# Patient Record
Sex: MALE | Race: BLACK OR AFRICAN AMERICAN | Employment: FULL TIME | URBAN - METROPOLITAN AREA
[De-identification: names, ages, dates, MRNs, and addresses within clinical notes are randomized per-mention and may not be internally consistent; named-entity substitution may affect disease eponyms.]

---

## 2017-08-29 ENCOUNTER — HOSPITAL ENCOUNTER (EMERGENCY)
Age: 37
Discharge: HOME | End: 2017-08-29
Attending: EMERGENCY MEDICINE

## 2017-08-29 ENCOUNTER — APPOINTMENT (OUTPATIENT)
Dept: GENERAL RADIOLOGY | Age: 37
End: 2017-08-29

## 2017-08-29 VITALS
TEMPERATURE: 98.8 F | SYSTOLIC BLOOD PRESSURE: 186 MMHG | OXYGEN SATURATION: 98 % | DIASTOLIC BLOOD PRESSURE: 111 MMHG | HEART RATE: 93 BPM | BODY MASS INDEX: 42.66 KG/M2 | HEIGHT: 72 IN | RESPIRATION RATE: 20 BRPM | WEIGHT: 315 LBS

## 2017-08-29 DIAGNOSIS — R04.2 HEMOPTYSIS: ICD-10-CM

## 2017-08-29 DIAGNOSIS — M54.6 ACUTE RIGHT-SIDED THORACIC BACK PAIN: Primary | ICD-10-CM

## 2017-08-29 DIAGNOSIS — I10 ESSENTIAL HYPERTENSION: ICD-10-CM

## 2017-08-29 LAB
-: NORMAL
ABSOLUTE EOS #: 0.2 K/UL (ref 0–0.4)
ABSOLUTE LYMPH #: 2 K/UL (ref 1–4.8)
ABSOLUTE MONO #: 0.5 K/UL (ref 0.2–0.8)
ALBUMIN SERPL-MCNC: 3.8 G/DL (ref 3.5–5.2)
ALBUMIN/GLOBULIN RATIO: ABNORMAL (ref 1–2.5)
ALP BLD-CCNC: 71 U/L (ref 40–129)
ALT SERPL-CCNC: 18 U/L (ref 5–41)
AMORPHOUS: NORMAL
ANION GAP SERPL CALCULATED.3IONS-SCNC: 12 MMOL/L (ref 9–17)
AST SERPL-CCNC: 21 U/L
BACTERIA: NORMAL
BASOPHILS # BLD: 1 %
BASOPHILS ABSOLUTE: 0.1 K/UL (ref 0–0.2)
BILIRUB SERPL-MCNC: 0.23 MG/DL (ref 0.3–1.2)
BILIRUBIN DIRECT: 0.08 MG/DL
BILIRUBIN URINE: NEGATIVE
BILIRUBIN, INDIRECT: 0.15 MG/DL (ref 0–1)
BUN BLDV-MCNC: 12 MG/DL (ref 6–20)
BUN/CREAT BLD: 11 (ref 9–20)
CALCIUM SERPL-MCNC: 9.1 MG/DL (ref 8.6–10.4)
CASTS UA: NORMAL /LPF
CHLORIDE BLD-SCNC: 103 MMOL/L (ref 98–107)
CO2: 29 MMOL/L (ref 20–31)
COLOR: YELLOW
COMMENT UA: ABNORMAL
CREAT SERPL-MCNC: 1.09 MG/DL (ref 0.7–1.2)
CRYSTALS, UA: NORMAL /HPF
D-DIMER QUANTITATIVE: 0.4 MG/L FEU
DIFFERENTIAL TYPE: NORMAL
EOSINOPHILS RELATIVE PERCENT: 2 %
EPITHELIAL CELLS UA: NORMAL /HPF
GFR AFRICAN AMERICAN: >60 ML/MIN
GFR NON-AFRICAN AMERICAN: >60 ML/MIN
GFR SERPL CREATININE-BSD FRML MDRD: ABNORMAL ML/MIN/{1.73_M2}
GFR SERPL CREATININE-BSD FRML MDRD: ABNORMAL ML/MIN/{1.73_M2}
GLOBULIN: ABNORMAL G/DL (ref 1.5–3.8)
GLUCOSE BLD-MCNC: 79 MG/DL (ref 70–99)
GLUCOSE URINE: NEGATIVE
HCT VFR BLD CALC: 44 % (ref 41–53)
HEMOGLOBIN: 14.6 G/DL (ref 13.5–17.5)
KETONES, URINE: NEGATIVE
LEUKOCYTE ESTERASE, URINE: NEGATIVE
LYMPHOCYTES # BLD: 26 %
MCH RBC QN AUTO: 29.3 PG (ref 26–34)
MCHC RBC AUTO-ENTMCNC: 33.2 G/DL (ref 31–37)
MCV RBC AUTO: 88 FL (ref 80–100)
MONOCYTES # BLD: 6 %
MUCUS: NORMAL
NITRITE, URINE: NEGATIVE
OTHER OBSERVATIONS UA: NORMAL
PDW BLD-RTO: 13.3 % (ref 11.5–14.5)
PH UA: 6 (ref 5–8)
PLATELET # BLD: 260 K/UL (ref 130–400)
PLATELET ESTIMATE: NORMAL
PMV BLD AUTO: NORMAL FL (ref 6–12)
POTASSIUM SERPL-SCNC: 3.4 MMOL/L (ref 3.7–5.3)
PROTEIN UA: ABNORMAL
RBC # BLD: 5 M/UL (ref 4.5–5.9)
RBC # BLD: NORMAL 10*6/UL
RBC UA: NORMAL /HPF (ref 0–2)
RENAL EPITHELIAL, UA: NORMAL /HPF
SEG NEUTROPHILS: 65 %
SEGMENTED NEUTROPHILS ABSOLUTE COUNT: 4.9 K/UL (ref 1.8–7.7)
SODIUM BLD-SCNC: 144 MMOL/L (ref 135–144)
SPECIFIC GRAVITY UA: 1.02 (ref 1–1.03)
TOTAL PROTEIN: 7.4 G/DL (ref 6.4–8.3)
TRICHOMONAS: NORMAL
TURBIDITY: CLEAR
URINE HGB: NEGATIVE
UROBILINOGEN, URINE: NORMAL
WBC # BLD: 7.7 K/UL (ref 3.5–11)
WBC # BLD: NORMAL 10*3/UL
WBC UA: NORMAL /HPF (ref 0–5)
YEAST: NORMAL

## 2017-08-29 PROCEDURE — 96375 TX/PRO/DX INJ NEW DRUG ADDON: CPT

## 2017-08-29 PROCEDURE — 71020 XR CHEST STANDARD TWO VW: CPT

## 2017-08-29 PROCEDURE — 80048 BASIC METABOLIC PNL TOTAL CA: CPT

## 2017-08-29 PROCEDURE — 85379 FIBRIN DEGRADATION QUANT: CPT

## 2017-08-29 PROCEDURE — 99284 EMERGENCY DEPT VISIT MOD MDM: CPT

## 2017-08-29 PROCEDURE — 80076 HEPATIC FUNCTION PANEL: CPT

## 2017-08-29 PROCEDURE — 96374 THER/PROPH/DIAG INJ IV PUSH: CPT

## 2017-08-29 PROCEDURE — 85025 COMPLETE CBC W/AUTO DIFF WBC: CPT

## 2017-08-29 PROCEDURE — 6360000002 HC RX W HCPCS: Performed by: NURSE PRACTITIONER

## 2017-08-29 PROCEDURE — 81001 URINALYSIS AUTO W/SCOPE: CPT

## 2017-08-29 RX ORDER — BENZONATATE 100 MG/1
100 CAPSULE ORAL 3 TIMES DAILY PRN
Qty: 30 CAPSULE | Refills: 0 | Status: SHIPPED | OUTPATIENT
Start: 2017-08-29 | End: 2017-09-05

## 2017-08-29 RX ORDER — ORPHENADRINE CITRATE 30 MG/ML
60 INJECTION INTRAMUSCULAR; INTRAVENOUS ONCE
Status: COMPLETED | OUTPATIENT
Start: 2017-08-29 | End: 2017-08-29

## 2017-08-29 RX ORDER — AZITHROMYCIN 250 MG/1
TABLET, FILM COATED ORAL
Qty: 1 PACKET | Refills: 0 | Status: SHIPPED | OUTPATIENT
Start: 2017-08-29 | End: 2017-09-08

## 2017-08-29 RX ORDER — MORPHINE SULFATE 2 MG/ML
2 INJECTION, SOLUTION INTRAMUSCULAR; INTRAVENOUS ONCE
Status: COMPLETED | OUTPATIENT
Start: 2017-08-29 | End: 2017-08-29

## 2017-08-29 RX ORDER — HYDROCODONE BITARTRATE AND ACETAMINOPHEN 5; 325 MG/1; MG/1
1 TABLET ORAL EVERY 6 HOURS PRN
Qty: 20 TABLET | Refills: 0 | Status: SHIPPED | OUTPATIENT
Start: 2017-08-29 | End: 2017-09-05

## 2017-08-29 RX ADMIN — MORPHINE SULFATE 2 MG: 2 INJECTION, SOLUTION INTRAMUSCULAR; INTRAVENOUS at 19:52

## 2017-08-29 RX ADMIN — ORPHENADRINE CITRATE 60 MG: 30 INJECTION INTRAMUSCULAR; INTRAVENOUS at 21:13

## 2017-08-29 ASSESSMENT — PAIN DESCRIPTION - PAIN TYPE
TYPE: ACUTE PAIN
TYPE: ACUTE PAIN

## 2017-08-29 ASSESSMENT — PAIN DESCRIPTION - LOCATION
LOCATION: FLANK;ABDOMEN
LOCATION: FLANK;ABDOMEN

## 2017-08-29 ASSESSMENT — PAIN DESCRIPTION - ORIENTATION
ORIENTATION: RIGHT
ORIENTATION: RIGHT

## 2017-08-29 ASSESSMENT — PAIN SCALES - GENERAL
PAINLEVEL_OUTOF10: 6
PAINLEVEL_OUTOF10: 10

## 2019-03-31 ENCOUNTER — HOSPITAL ENCOUNTER (EMERGENCY)
Age: 39
Discharge: HOME | End: 2019-03-31
Attending: EMERGENCY MEDICINE
Payer: COMMERCIAL

## 2019-03-31 VITALS
OXYGEN SATURATION: 96 % | BODY MASS INDEX: 41.75 KG/M2 | HEART RATE: 94 BPM | RESPIRATION RATE: 18 BRPM | HEIGHT: 73 IN | TEMPERATURE: 98.4 F | WEIGHT: 315 LBS | SYSTOLIC BLOOD PRESSURE: 170 MMHG | DIASTOLIC BLOOD PRESSURE: 121 MMHG

## 2019-03-31 DIAGNOSIS — H10.9 CONJUNCTIVITIS OF RIGHT EYE, UNSPECIFIED CONJUNCTIVITIS TYPE: Primary | ICD-10-CM

## 2019-03-31 PROCEDURE — 99282 EMERGENCY DEPT VISIT SF MDM: CPT

## 2019-03-31 RX ORDER — FLUTICASONE PROPIONATE 50 MCG
1 SPRAY, SUSPENSION (ML) NASAL DAILY
Qty: 1 BOTTLE | Refills: 0 | Status: SHIPPED | OUTPATIENT
Start: 2019-03-31 | End: 2022-07-19 | Stop reason: SDUPTHER

## 2019-03-31 RX ORDER — SULFACETAMIDE SODIUM 100 MG/ML
2 SOLUTION/ DROPS OPHTHALMIC
Qty: 1 BOTTLE | Refills: 0 | Status: SHIPPED | OUTPATIENT
Start: 2019-03-31 | End: 2019-04-10

## 2019-03-31 ASSESSMENT — ENCOUNTER SYMPTOMS
EYE REDNESS: 1
SHORTNESS OF BREATH: 0
PHOTOPHOBIA: 0
SORE THROAT: 0
EYE DISCHARGE: 0
EYE ITCHING: 1
COLOR CHANGE: 0
EYE PAIN: 0
RHINORRHEA: 1

## 2019-03-31 NOTE — LETTER
[facility] ([place], [place]) ED  [facility] [address]  Phone: [phone]             March 31, 2019    Patient: Huber Benjamin   YOB: 1980   Date of Visit: 3/31/2019       To Whom It May Concern:    Edwar Sanderson was seen and treated in our emergency department on 3/31/2019. Please excuse him from work today.     Sincerely,             Signature:__________________________________

## 2019-03-31 NOTE — DISCHARGE INSTR - COC
Continuity of Care Form    Patient Name: Enrique Maguire   :  1980  MRN:  [id number]    Admit date:  3/31/2019  Discharge date:  ***    Code Status Order: Prior   Advance Directives:     Admitting Physician:  No admitting provider for patient encounter. PCP: No primary care provider on file. Discharging Nurse: [facility] Unit/Room#: STA06/06  Discharging Unit Phone Number: ***    Emergency Contact:   Extended Emergency Contact Information  Primary Emergency Contact: Deanna Gilliland   [address] Phone: [phone]  Relation: Other    Past Surgical History:  History reviewed. No pertinent surgical history. Immunization History: There is no immunization history on file for this patient.     Active Problems:  Patient Active Problem List   Diagnosis Code    Obesity (BMI 35.0-39.9 without comorbidity) E66.9    Back pain M54.9    H/O: upper GI bleed Z87.19    HLD (hyperlipidemia) E78.5    Unilateral paresis (HCC) G83.9    Radicular low back pain M54.10    JOE on CPAP G47.33, Z99.89    HTN (hypertension) I10    GERD (gastroesophageal reflux disease) K21.9       Isolation/Infection:   Isolation          No Isolation            Nurse Assessment:  Last Vital Signs: BP (!) 186/126   Pulse 94   Temp 98.4 °F (36.9 °C)   Resp 18   Ht 6' 1\" (1.854 m)   Wt (!) 373 lb 1 oz (169.2 kg)   SpO2 96%   BMI 49.22 kg/m²     Last documented pain score (0-10 scale):    Last Weight:   Wt Readings from Last 1 Encounters:   19 (!) 373 lb 1 oz (169.2 kg)     Mental Status:  {IP PT MENTAL STATUS:}    IV Access:  { IVAN IV ACCESS:887500940}    Nursing Mobility/ADLs:  Walking   {P DME NMJS:085737719}  Transfer  {[facility] DME CCPY:367763517}  Bathing  {P DME PNCB:744281631}  Dressing  {P DME ICMN:964745342}  Toileting  {P DME YMGR:552580407}  Feeding  {[facility] DME OVWL:866511755}  Med Admin  {[facility] DME UUG}  Med Delivery   {[facility] MED Delivery:268688161}    Wound Care Documentation and Therapy:        Elimination:  Continence:   · Bowel: {YES / HZ:56875}  · Bladder: {YES / IA:73627}  Urinary Catheter: {Urinary Catheter:626687927}   Colostomy/Ileostomy/Ileal Conduit: {YES / UW:44271}       Date of Last BM: ***  No intake or output data in the 24 hours ending 19 1601  No intake/output data recorded.     Safety Concerns:     [facility] Safety Concerns:395525531}    Impairments/Disabilities:      [facility] Impairments/Disabilities:200939815}    Nutrition Therapy:  Current Nutrition Therapy:   [facility] Diet List:917125868}    Routes of Feeding: {CHP DME Other Feedings:285062230}  Liquids: {Slp liquid thickness:66738}  Daily Fluid Restriction: {CHP DME Yes amt example:895537344}  Last Modified Barium Swallow with Video (Video Swallowing Test): {Done Not Done OMWQ:133561136}    Treatments at the Time of Hospital Discharge:   Respiratory Treatments: ***  Oxygen Therapy:  {Therapy; copd oxygen:21557}  Ventilator:    { CC Vent ZQHW:934429296}    Rehab Therapies: {THERAPEUTIC INTERVENTION:4333488072}  Weight Bearing Status/Restrictions: [facility] Weight Bearin}  Other Medical Equipment (for information only, NOT a DME order):  {EQUIPMENT:255148132}  Other Treatments: ***    Patient's personal belongings (please select all that are sent with patient):  {[facility] DME Belongings:432984175}    RN SIGNATURE:  {Esignature:401271151}    CASE MANAGEMENT/SOCIAL WORK SECTION    Inpatient Status Date: ***    Readmission Risk Assessment Score:  Readmission Risk              Risk of Unplanned Readmission:        0           Discharging to Facility/ Agency   · Name:   · Address:  · Phone:  · Fax:    Dialysis Facility (if applicable)   · Name:  · Address:  · Dialysis Schedule:  · Phone:  · Fax:    / signature: {Esignature:820387022}    PHYSICIAN SECTION    Prognosis: {Prognosis:1474736730}    Condition at Discharge: [facility] Patient Condition:687333050}    Rehab Potential (if transferring to Rehab): {Prognosis:7716266452}    Recommended Labs or Other Treatments After Discharge: ***    Physician Certification: I certify the above information and transfer of Jose Vinson  is necessary for the continuing treatment of the diagnosis listed and that he requires {Admit to Appropriate Level of Care:95767} for {GREATER/LESS:586451681} 30 days.      Update Admission H&P: {CHP DME Changes in JSETS:775621294}    PHYSICIAN SIGNATURE:  {Esignature:185101793}

## 2019-03-31 NOTE — ED NOTES
NP made aware of elevated B/P. Per NP it is OK to continue with discharge. Pt instructed to take prescribed medications today ASAP.      Tequila Ness LPN  23/59/84 1312

## 2019-03-31 NOTE — ED PROVIDER NOTES
Vitals:    Vitals:    03/31/19 1552 03/31/19 1553   BP: (!) 186/126    Pulse: 94    Resp: 18    Temp: 98.4 °F (36.9 °C)    SpO2: 96%    Weight:  (!) 373 lb 1 oz (169.2 kg)   Height:  6' 1\" (1.854 m)       CLINICAL DECISION MAKING:  The patient presented alert with a nontoxic appearance and was seen in conjunction with Dr. Minerva Rose. Prescriptions were written for bleph-10 and Flonase. Follow up with pcp, return to ED if condition worsens. He has not taken his BP medication yet today; he is going to take it when he leaves. FINAL IMPRESSION      1. Conjunctivitis of right eye, unspecified conjunctivitis type            Problem List  Patient Active Problem List   Diagnosis Code    Obesity (BMI 35.0-39.9 without comorbidity) E66.9    Back pain M54.9    H/O: upper GI bleed Z87.19    HLD (hyperlipidemia) E78.5    Unilateral paresis (HCC) G83.9    Radicular low back pain M54.10    JOE on CPAP G47.33, Z99.89    HTN (hypertension) I10    GERD (gastroesophageal reflux disease) K21.9         DISPOSITION/PLAN   DISPOSITION Decision To Discharge 03/31/2019 04:00:24 PM      PATIENT REFERRED TO:   Call Dee Pittman to establish care for follow up.     Schedule an appointment as soon as possible for a visit       [facility] ED  [address] [facility]  [phone]    If symptoms worsen, As needed      DISCHARGE MEDICATIONS:     New Prescriptions    FLUTICASONE (FLONASE) 50 MCG/ACT NASAL SPRAY    1 spray by Each Nare route daily    SULFACETAMIDE (BLEPH-10) 10 % OPHTHALMIC SOLUTION    Place 2 drops into the right eye every 3 hours for 10 days           (Please note that portions of this note were completed with a voice recognition program.  Efforts were made to edit the dictations but occasionally words are mis-transcribed.)    TORIBIO Shepherd CNP, APRN - CNP  03/31/19 9231

## 2019-07-01 ENCOUNTER — HOSPITAL ENCOUNTER (EMERGENCY)
Age: 39
Discharge: HOME | End: 2019-07-01
Attending: EMERGENCY MEDICINE
Payer: COMMERCIAL

## 2019-07-01 VITALS
TEMPERATURE: 98.2 F | OXYGEN SATURATION: 97 % | WEIGHT: 315 LBS | RESPIRATION RATE: 18 BRPM | HEIGHT: 72 IN | BODY MASS INDEX: 42.66 KG/M2 | HEART RATE: 86 BPM | SYSTOLIC BLOOD PRESSURE: 164 MMHG | DIASTOLIC BLOOD PRESSURE: 109 MMHG

## 2019-07-01 DIAGNOSIS — E87.6 HYPOKALEMIA: ICD-10-CM

## 2019-07-01 DIAGNOSIS — I10 HYPERTENSION, UNSPECIFIED TYPE: ICD-10-CM

## 2019-07-01 DIAGNOSIS — M62.830 BACK SPASM: Primary | ICD-10-CM

## 2019-07-01 LAB
-: ABNORMAL
ABSOLUTE EOS #: 0.11 K/UL (ref 0–0.44)
ABSOLUTE IMMATURE GRANULOCYTE: 0.02 K/UL (ref 0–0.3)
ABSOLUTE LYMPH #: 1.93 K/UL (ref 1.1–3.7)
ABSOLUTE MONO #: 0.59 K/UL (ref 0.1–1.2)
AMORPHOUS: ABNORMAL
ANION GAP SERPL CALCULATED.3IONS-SCNC: 12 MMOL/L (ref 9–17)
BACTERIA: ABNORMAL
BASOPHILS # BLD: 1 % (ref 0–2)
BASOPHILS ABSOLUTE: 0.04 K/UL (ref 0–0.2)
BILIRUBIN URINE: NEGATIVE
BUN BLDV-MCNC: 17 MG/DL (ref 6–20)
BUN/CREAT BLD: 16 (ref 9–20)
CALCIUM SERPL-MCNC: 8.8 MG/DL (ref 8.6–10.4)
CASTS UA: ABNORMAL /LPF
CHLORIDE BLD-SCNC: 103 MMOL/L (ref 98–107)
CO2: 27 MMOL/L (ref 20–31)
COLOR: YELLOW
COMMENT UA: ABNORMAL
CREAT SERPL-MCNC: 1.06 MG/DL (ref 0.7–1.2)
CRYSTALS, UA: ABNORMAL /HPF
DIFFERENTIAL TYPE: ABNORMAL
EOSINOPHILS RELATIVE PERCENT: 2 % (ref 1–4)
EPITHELIAL CELLS UA: ABNORMAL /HPF (ref 0–5)
GFR AFRICAN AMERICAN: >60 ML/MIN
GFR NON-AFRICAN AMERICAN: >60 ML/MIN
GFR SERPL CREATININE-BSD FRML MDRD: ABNORMAL ML/MIN/{1.73_M2}
GFR SERPL CREATININE-BSD FRML MDRD: ABNORMAL ML/MIN/{1.73_M2}
GLUCOSE BLD-MCNC: 93 MG/DL (ref 70–99)
GLUCOSE URINE: NEGATIVE
HCT VFR BLD CALC: 40.7 % (ref 40.7–50.3)
HEMOGLOBIN: 12.9 G/DL (ref 13–17)
IMMATURE GRANULOCYTES: 0 %
KETONES, URINE: NEGATIVE
LEUKOCYTE ESTERASE, URINE: ABNORMAL
LYMPHOCYTES # BLD: 26 % (ref 24–43)
MCH RBC QN AUTO: 28.7 PG (ref 25.2–33.5)
MCHC RBC AUTO-ENTMCNC: 31.7 G/DL (ref 28–38)
MCV RBC AUTO: 90.6 FL (ref 82.6–102.9)
MONOCYTES # BLD: 8 % (ref 3–12)
MUCUS: ABNORMAL
NITRITE, URINE: NEGATIVE
NRBC AUTOMATED: ABNORMAL PER 100 WBC
OTHER OBSERVATIONS UA: ABNORMAL
PDW BLD-RTO: 13.6 % (ref 11.8–14.4)
PH UA: 6 (ref 5–8)
PLATELET # BLD: 246 K/UL (ref 138–453)
PLATELET ESTIMATE: ABNORMAL
PMV BLD AUTO: 9.8 FL (ref 8.1–13.5)
POTASSIUM SERPL-SCNC: 3.2 MMOL/L (ref 3.7–5.3)
PROTEIN UA: ABNORMAL
RBC # BLD: 4.49 M/UL (ref 4.21–5.77)
RBC # BLD: ABNORMAL 10*6/UL
RBC UA: ABNORMAL /HPF (ref 0–2)
RENAL EPITHELIAL, UA: ABNORMAL /HPF
SEG NEUTROPHILS: 64 % (ref 36–65)
SEGMENTED NEUTROPHILS ABSOLUTE COUNT: 4.82 K/UL (ref 1.5–8.1)
SODIUM BLD-SCNC: 142 MMOL/L (ref 135–144)
SPECIFIC GRAVITY UA: 1.02 (ref 1–1.03)
TRICHOMONAS: ABNORMAL
TURBIDITY: CLEAR
URINE HGB: NEGATIVE
UROBILINOGEN, URINE: NORMAL
WBC # BLD: 7.5 K/UL (ref 3.5–11.3)
WBC # BLD: ABNORMAL 10*3/UL
WBC UA: ABNORMAL /HPF (ref 0–5)
YEAST: ABNORMAL

## 2019-07-01 PROCEDURE — 99283 EMERGENCY DEPT VISIT LOW MDM: CPT

## 2019-07-01 PROCEDURE — 81001 URINALYSIS AUTO W/SCOPE: CPT

## 2019-07-01 PROCEDURE — 6370000000 HC RX 637 (ALT 250 FOR IP): Performed by: NURSE PRACTITIONER

## 2019-07-01 PROCEDURE — 80048 BASIC METABOLIC PNL TOTAL CA: CPT

## 2019-07-01 PROCEDURE — 87086 URINE CULTURE/COLONY COUNT: CPT

## 2019-07-01 PROCEDURE — 96372 THER/PROPH/DIAG INJ SC/IM: CPT

## 2019-07-01 PROCEDURE — 6360000002 HC RX W HCPCS: Performed by: NURSE PRACTITIONER

## 2019-07-01 PROCEDURE — 85025 COMPLETE CBC W/AUTO DIFF WBC: CPT

## 2019-07-01 RX ORDER — AMLODIPINE BESYLATE 5 MG/1
10 TABLET ORAL ONCE
Status: COMPLETED | OUTPATIENT
Start: 2019-07-01 | End: 2019-07-01

## 2019-07-01 RX ORDER — POTASSIUM CHLORIDE 20 MEQ/1
40 TABLET, EXTENDED RELEASE ORAL ONCE
Status: COMPLETED | OUTPATIENT
Start: 2019-07-01 | End: 2019-07-01

## 2019-07-01 RX ORDER — CYCLOBENZAPRINE HCL 10 MG
10 TABLET ORAL 3 TIMES DAILY PRN
Qty: 15 TABLET | Refills: 0 | Status: SHIPPED | OUTPATIENT
Start: 2019-07-01 | End: 2019-07-06

## 2019-07-01 RX ORDER — KETOROLAC TROMETHAMINE 30 MG/ML
60 INJECTION, SOLUTION INTRAMUSCULAR; INTRAVENOUS ONCE
Status: COMPLETED | OUTPATIENT
Start: 2019-07-01 | End: 2019-07-01

## 2019-07-01 RX ORDER — IBUPROFEN 800 MG/1
800 TABLET ORAL EVERY 8 HOURS PRN
Qty: 30 TABLET | Refills: 0 | Status: ON HOLD | OUTPATIENT
Start: 2019-07-01 | End: 2020-07-11 | Stop reason: HOSPADM

## 2019-07-01 RX ORDER — ORPHENADRINE CITRATE 30 MG/ML
60 INJECTION INTRAMUSCULAR; INTRAVENOUS ONCE
Status: COMPLETED | OUTPATIENT
Start: 2019-07-01 | End: 2019-07-01

## 2019-07-01 RX ORDER — LISINOPRIL AND HYDROCHLOROTHIAZIDE 25; 20 MG/1; MG/1
1 TABLET ORAL DAILY
Status: DISCONTINUED | OUTPATIENT
Start: 2019-07-01 | End: 2019-07-01 | Stop reason: HOSPADM

## 2019-07-01 RX ORDER — AMLODIPINE BESYLATE 10 MG/1
10 TABLET ORAL DAILY
COMMUNITY
End: 2022-07-19 | Stop reason: SDUPTHER

## 2019-07-01 RX ADMIN — LISINOPRIL AND HYDROCHLOROTHIAZIDE 1 TABLET: 25; 20 TABLET ORAL at 14:09

## 2019-07-01 RX ADMIN — KETOROLAC TROMETHAMINE 60 MG: 30 INJECTION, SOLUTION INTRAMUSCULAR at 14:10

## 2019-07-01 RX ADMIN — AMLODIPINE BESYLATE 10 MG: 5 TABLET ORAL at 14:09

## 2019-07-01 RX ADMIN — ORPHENADRINE CITRATE 60 MG: 30 INJECTION INTRAMUSCULAR; INTRAVENOUS at 14:11

## 2019-07-01 RX ADMIN — POTASSIUM CHLORIDE 40 MEQ: 20 TABLET, EXTENDED RELEASE ORAL at 15:33

## 2019-07-01 ASSESSMENT — PAIN SCALES - GENERAL
PAINLEVEL_OUTOF10: 10
PAINLEVEL_OUTOF10: 10

## 2019-07-01 ASSESSMENT — ENCOUNTER SYMPTOMS: BACK PAIN: 1

## 2019-07-01 ASSESSMENT — PAIN DESCRIPTION - LOCATION: LOCATION: FLANK;BACK

## 2019-07-01 NOTE — ED PROVIDER NOTES
R-0Print             ALLERGIES     Pcn [penicillins]    FAMILY HISTORY       Family History   Problem Relation Age of Onset    Diabetes Mother     High Blood Pressure Father     Cancer Maternal Grandmother           SOCIAL HISTORY       Social History     Socioeconomic History    Marital status: Single     Spouse name: None    Number of children: None    Years of education: None    Highest education level: None   Occupational History    None   Social Needs    Financial resource strain: None    Food insecurity:     Worry: None     Inability: None    Transportation needs:     Medical: None     Non-medical: None   Tobacco Use    Smoking status: Never Smoker    Smokeless tobacco: Never Used   Substance and Sexual Activity    Alcohol use: Yes     Alcohol/week: 0.0 oz     Comment: socially    Drug use: No    Sexual activity: None   Lifestyle    Physical activity:     Days per week: None     Minutes per session: None    Stress: None   Relationships    Social connections:     Talks on phone: None     Gets together: None     Attends Baptism service: None     Active member of club or organization: None     Attends meetings of clubs or organizations: None     Relationship status: None    Intimate partner violence:     Fear of current or ex partner: None     Emotionally abused: None     Physically abused: None     Forced sexual activity: None   Other Topics Concern    None   Social History Narrative    None         REVIEW OF SYSTEMS    (2-9 systems for level 4, 10 or more for level 5)     Review of Systems   Musculoskeletal: Positive for back pain. All other systems reviewed and are negative. Except as noted above the remainder of the review of systems was reviewed and negative.      PHYSICAL EXAM    (up to 7 for level 4, 8 or more for level 5)     ED Triage Vitals [07/01/19 1249]   BP Temp Temp Source Pulse Resp SpO2 Height Weight   (!) 192/108 98.2 °F (36.8 °C) Oral 86 18 97 % 6' (1.829 m) (!) 374 lb 3.2 oz (169.7 kg)       Physical Exam   Constitutional: He is oriented to person, place, and time. He appears well-developed and well-nourished. HENT:   Head: Normocephalic and atraumatic. Right Ear: External ear normal.   Left Ear: External ear normal.   Nose: Nose normal.   Mouth/Throat: Oropharynx is clear and moist.   Eyes: Pupils are equal, round, and reactive to light. Conjunctivae and EOM are normal.   Neck: Normal range of motion. Neck supple. Pulmonary/Chest: Effort normal. No respiratory distress. Musculoskeletal:        Lumbar back: He exhibits decreased range of motion, tenderness, pain and spasm. Neurological: He is alert and oriented to person, place, and time. He has normal strength. No cranial nerve deficit or sensory deficit. GCS eye subscore is 4. GCS verbal subscore is 5. GCS motor subscore is 6. Skin: Skin is warm, dry and intact. Capillary refill takes less than 2 seconds. No ecchymosis and no rash noted. No erythema. Psychiatric: He has a normal mood and affect. His behavior is normal. Judgment and thought content normal.         DIAGNOSTIC RESULTS     EKG:All EKG's are interpreted by the Emergency Department Physician who either signs or Co-signs this chart in the absence of a cardiologist.        RADIOLOGY:   Non-plain film images such as CT, Ultrasound and MRI are read by theradiologist. Plain radiographic images are visualized and preliminarily interpreted by the emergency physician with the below findings:        Interpretation per the Radiologist below, if available at the time of this note:    No orders to display         EDBEDSIDE ULTRASOUND:   Performed by Lachelle Elliott - none    LABS:  [unfilled]    All other labs were within normal range or not returned as of this dictation. EMERGENCY DEPARTMENT COURSE andDIFFERENTIAL DIAGNOSIS/MDM:   Labs reviewed. Urinalysis unremarkable. Examination shows back spasm.   Blood pressure is 192/108 upon arrival.  Patient denies

## 2019-07-02 LAB
CULTURE: NO GROWTH
Lab: NORMAL
SPECIMEN DESCRIPTION: NORMAL

## 2020-07-06 ENCOUNTER — APPOINTMENT (OUTPATIENT)
Dept: GENERAL RADIOLOGY | Facility: CLINIC | Age: 40
End: 2020-07-06
Payer: COMMERCIAL

## 2020-07-06 ENCOUNTER — HOSPITAL ENCOUNTER (INPATIENT)
Age: 40
LOS: 5 days | Discharge: HOME | DRG: 871 | End: 2020-07-11
Attending: FAMILY MEDICINE | Admitting: INTERNAL MEDICINE
Payer: COMMERCIAL

## 2020-07-06 ENCOUNTER — APPOINTMENT (OUTPATIENT)
Dept: CT IMAGING | Facility: CLINIC | Age: 40
End: 2020-07-06
Payer: COMMERCIAL

## 2020-07-06 ENCOUNTER — HOSPITAL ENCOUNTER (EMERGENCY)
Facility: CLINIC | Age: 40
Discharge: ANOTHER HOSPITAL | End: 2020-07-06
Attending: EMERGENCY MEDICINE
Payer: COMMERCIAL

## 2020-07-06 VITALS
BODY MASS INDEX: 42.66 KG/M2 | TEMPERATURE: 98.8 F | HEART RATE: 80 BPM | RESPIRATION RATE: 22 BRPM | SYSTOLIC BLOOD PRESSURE: 148 MMHG | WEIGHT: 315 LBS | OXYGEN SATURATION: 97 % | HEIGHT: 72 IN | DIASTOLIC BLOOD PRESSURE: 99 MMHG

## 2020-07-06 PROBLEM — R09.02 HYPOXIA: Status: ACTIVE | Noted: 2020-07-06

## 2020-07-06 PROBLEM — E66.01 MORBID OBESITY (HCC): Status: ACTIVE | Noted: 2020-07-06

## 2020-07-06 PROBLEM — J96.01 ACUTE RESPIRATORY FAILURE WITH HYPOXIA (HCC): Status: ACTIVE | Noted: 2020-07-06

## 2020-07-06 PROBLEM — U07.1 PNEUMONIA DUE TO COVID-19 VIRUS: Status: ACTIVE | Noted: 2020-07-06

## 2020-07-06 PROBLEM — J12.82 PNEUMONIA DUE TO COVID-19 VIRUS: Status: ACTIVE | Noted: 2020-07-06

## 2020-07-06 LAB
ABSOLUTE EOS #: 0 K/UL (ref 0–0.4)
ABSOLUTE IMMATURE GRANULOCYTE: ABNORMAL K/UL (ref 0–0.3)
ABSOLUTE LYMPH #: 1.3 K/UL (ref 1–4.8)
ABSOLUTE MONO #: 0.5 K/UL (ref 0.1–1.2)
ALBUMIN SERPL-MCNC: 3.5 G/DL (ref 3.5–5.2)
ALBUMIN/GLOBULIN RATIO: 1.1 (ref 1–2.5)
ALP BLD-CCNC: 56 U/L (ref 40–129)
ALT SERPL-CCNC: 24 U/L (ref 5–41)
ANION GAP SERPL CALCULATED.3IONS-SCNC: 11 MMOL/L (ref 9–17)
AST SERPL-CCNC: 27 U/L
BASOPHILS # BLD: 1 % (ref 0–2)
BASOPHILS ABSOLUTE: 0 K/UL (ref 0–0.2)
BILIRUB SERPL-MCNC: 0.5 MG/DL (ref 0.3–1.2)
BNP INTERPRETATION: ABNORMAL
BUN BLDV-MCNC: 16 MG/DL (ref 6–20)
BUN/CREAT BLD: ABNORMAL (ref 9–20)
CALCIUM SERPL-MCNC: 8 MG/DL (ref 8.6–10.4)
CHLORIDE BLD-SCNC: 101 MMOL/L (ref 98–107)
CO2: 27 MMOL/L (ref 20–31)
CREAT SERPL-MCNC: 1.5 MG/DL (ref 0.7–1.2)
D-DIMER QUANTITATIVE: 0.4 MG/L FEU
DIFFERENTIAL TYPE: ABNORMAL
EKG ATRIAL RATE: 81 BPM
EKG ATRIAL RATE: 90 BPM
EKG P AXIS: 15 DEGREES
EKG P AXIS: 17 DEGREES
EKG P-R INTERVAL: 156 MS
EKG P-R INTERVAL: 162 MS
EKG Q-T INTERVAL: 416 MS
EKG Q-T INTERVAL: 426 MS
EKG QRS DURATION: 98 MS
EKG QRS DURATION: 98 MS
EKG QTC CALCULATION (BAZETT): 483 MS
EKG QTC CALCULATION (BAZETT): 521 MS
EKG R AXIS: -34 DEGREES
EKG R AXIS: -36 DEGREES
EKG T AXIS: 40 DEGREES
EKG T AXIS: 68 DEGREES
EKG VENTRICULAR RATE: 81 BPM
EKG VENTRICULAR RATE: 90 BPM
EOSINOPHILS RELATIVE PERCENT: 0 % (ref 1–4)
FERRITIN: 164 UG/L (ref 30–400)
GFR AFRICAN AMERICAN: >60 ML/MIN
GFR NON-AFRICAN AMERICAN: 52 ML/MIN
GFR SERPL CREATININE-BSD FRML MDRD: ABNORMAL ML/MIN/{1.73_M2}
GFR SERPL CREATININE-BSD FRML MDRD: ABNORMAL ML/MIN/{1.73_M2}
GLUCOSE BLD-MCNC: 123 MG/DL (ref 70–99)
HCT VFR BLD CALC: 44.4 % (ref 41–53)
HEMOGLOBIN: 14.4 G/DL (ref 13.5–17.5)
IMMATURE GRANULOCYTES: ABNORMAL %
LACTIC ACID: 0.8 MMOL/L (ref 0.5–2.2)
LYMPHOCYTES # BLD: 27 % (ref 24–44)
MCH RBC QN AUTO: 28.7 PG (ref 26–34)
MCHC RBC AUTO-ENTMCNC: 32.5 G/DL (ref 31–37)
MCV RBC AUTO: 88.2 FL (ref 80–100)
MONOCYTES # BLD: 11 % (ref 2–11)
NRBC AUTOMATED: ABNORMAL PER 100 WBC
PDW BLD-RTO: 14.7 % (ref 12.5–15.4)
PLATELET # BLD: 199 K/UL (ref 140–450)
PLATELET ESTIMATE: ABNORMAL
PMV BLD AUTO: 9.3 FL (ref 6–12)
POTASSIUM SERPL-SCNC: 3.1 MMOL/L (ref 3.7–5.3)
PRO-BNP: 318 PG/ML
PROCALCITONIN: 0.18 NG/ML
RBC # BLD: 5.03 M/UL (ref 4.5–5.9)
RBC # BLD: ABNORMAL 10*6/UL
SARS-COV-2, PCR: ABNORMAL
SARS-COV-2, RAPID: DETECTED
SARS-COV-2: ABNORMAL
SEG NEUTROPHILS: 61 % (ref 36–66)
SEGMENTED NEUTROPHILS ABSOLUTE COUNT: 2.9 K/UL (ref 1.8–7.7)
SODIUM BLD-SCNC: 139 MMOL/L (ref 135–144)
SOURCE: ABNORMAL
TOTAL PROTEIN: 6.7 G/DL (ref 6.4–8.3)
TROPONIN INTERP: ABNORMAL
TROPONIN INTERP: NORMAL
TROPONIN T: ABNORMAL NG/ML
TROPONIN T: NORMAL NG/ML
TROPONIN, HIGH SENSITIVITY: 21 NG/L (ref 0–22)
TROPONIN, HIGH SENSITIVITY: 23 NG/L (ref 0–22)
WBC # BLD: 4.7 K/UL (ref 3.5–11)
WBC # BLD: ABNORMAL 10*3/UL

## 2020-07-06 PROCEDURE — 71260 CT THORAX DX C+: CPT

## 2020-07-06 PROCEDURE — 71046 X-RAY EXAM CHEST 2 VIEWS: CPT

## 2020-07-06 PROCEDURE — 6360000002 HC RX W HCPCS: Performed by: NURSE PRACTITIONER

## 2020-07-06 PROCEDURE — 99223 1ST HOSP IP/OBS HIGH 75: CPT | Performed by: FAMILY MEDICINE

## 2020-07-06 PROCEDURE — 84145 PROCALCITONIN (PCT): CPT

## 2020-07-06 PROCEDURE — 36415 COLL VENOUS BLD VENIPUNCTURE: CPT

## 2020-07-06 PROCEDURE — 1200000000 HC SEMI PRIVATE

## 2020-07-06 PROCEDURE — 2580000003 HC RX 258: Performed by: EMERGENCY MEDICINE

## 2020-07-06 PROCEDURE — 84484 ASSAY OF TROPONIN QUANT: CPT

## 2020-07-06 PROCEDURE — U0002 COVID-19 LAB TEST NON-CDC: HCPCS

## 2020-07-06 PROCEDURE — 83605 ASSAY OF LACTIC ACID: CPT

## 2020-07-06 PROCEDURE — 99254 IP/OBS CNSLTJ NEW/EST MOD 60: CPT | Performed by: INTERNAL MEDICINE

## 2020-07-06 PROCEDURE — 93005 ELECTROCARDIOGRAM TRACING: CPT | Performed by: EMERGENCY MEDICINE

## 2020-07-06 PROCEDURE — 85379 FIBRIN DEGRADATION QUANT: CPT

## 2020-07-06 PROCEDURE — 99285 EMERGENCY DEPT VISIT HI MDM: CPT

## 2020-07-06 PROCEDURE — 87040 BLOOD CULTURE FOR BACTERIA: CPT

## 2020-07-06 PROCEDURE — 2580000003 HC RX 258: Performed by: NURSE PRACTITIONER

## 2020-07-06 PROCEDURE — 85025 COMPLETE CBC W/AUTO DIFF WBC: CPT

## 2020-07-06 PROCEDURE — 6370000000 HC RX 637 (ALT 250 FOR IP): Performed by: NURSE PRACTITIONER

## 2020-07-06 PROCEDURE — 82728 ASSAY OF FERRITIN: CPT

## 2020-07-06 PROCEDURE — 83880 ASSAY OF NATRIURETIC PEPTIDE: CPT

## 2020-07-06 PROCEDURE — 80053 COMPREHEN METABOLIC PANEL: CPT

## 2020-07-06 PROCEDURE — 6360000004 HC RX CONTRAST MEDICATION: Performed by: EMERGENCY MEDICINE

## 2020-07-06 RX ORDER — SODIUM CHLORIDE 0.9 % (FLUSH) 0.9 %
10 SYRINGE (ML) INJECTION PRN
Status: DISCONTINUED | OUTPATIENT
Start: 2020-07-06 | End: 2020-07-11 | Stop reason: HOSPADM

## 2020-07-06 RX ORDER — ONDANSETRON 2 MG/ML
4 INJECTION INTRAMUSCULAR; INTRAVENOUS EVERY 6 HOURS PRN
Status: DISCONTINUED | OUTPATIENT
Start: 2020-07-06 | End: 2020-07-11 | Stop reason: HOSPADM

## 2020-07-06 RX ORDER — SODIUM CHLORIDE 0.9 % (FLUSH) 0.9 %
10 SYRINGE (ML) INJECTION EVERY 12 HOURS SCHEDULED
Status: DISCONTINUED | OUTPATIENT
Start: 2020-07-06 | End: 2020-07-11 | Stop reason: HOSPADM

## 2020-07-06 RX ORDER — MAGNESIUM SULFATE 1 G/100ML
1 INJECTION INTRAVENOUS PRN
Status: DISCONTINUED | OUTPATIENT
Start: 2020-07-06 | End: 2020-07-11 | Stop reason: HOSPADM

## 2020-07-06 RX ORDER — 0.9 % SODIUM CHLORIDE 0.9 %
70 INTRAVENOUS SOLUTION INTRAVENOUS ONCE
Status: COMPLETED | OUTPATIENT
Start: 2020-07-06 | End: 2020-07-06

## 2020-07-06 RX ORDER — ACETAMINOPHEN 325 MG/1
650 TABLET ORAL EVERY 6 HOURS PRN
Status: DISCONTINUED | OUTPATIENT
Start: 2020-07-06 | End: 2020-07-11 | Stop reason: HOSPADM

## 2020-07-06 RX ORDER — POTASSIUM CHLORIDE 7.45 MG/ML
10 INJECTION INTRAVENOUS PRN
Status: DISCONTINUED | OUTPATIENT
Start: 2020-07-06 | End: 2020-07-11 | Stop reason: HOSPADM

## 2020-07-06 RX ORDER — POTASSIUM CHLORIDE 20 MEQ/1
40 TABLET, EXTENDED RELEASE ORAL PRN
Status: DISCONTINUED | OUTPATIENT
Start: 2020-07-06 | End: 2020-07-11 | Stop reason: HOSPADM

## 2020-07-06 RX ORDER — AMLODIPINE BESYLATE 10 MG/1
10 TABLET ORAL DAILY
Status: DISCONTINUED | OUTPATIENT
Start: 2020-07-06 | End: 2020-07-11 | Stop reason: HOSPADM

## 2020-07-06 RX ORDER — SODIUM CHLORIDE 0.9 % (FLUSH) 0.9 %
10 SYRINGE (ML) INJECTION PRN
Status: DISCONTINUED | OUTPATIENT
Start: 2020-07-06 | End: 2020-07-06 | Stop reason: HOSPADM

## 2020-07-06 RX ORDER — ACETAMINOPHEN 650 MG/1
650 SUPPOSITORY RECTAL EVERY 6 HOURS PRN
Status: DISCONTINUED | OUTPATIENT
Start: 2020-07-06 | End: 2020-07-11 | Stop reason: HOSPADM

## 2020-07-06 RX ORDER — LISINOPRIL AND HYDROCHLOROTHIAZIDE 25; 20 MG/1; MG/1
1 TABLET ORAL DAILY
Status: DISCONTINUED | OUTPATIENT
Start: 2020-07-06 | End: 2020-07-07

## 2020-07-06 RX ORDER — SODIUM CHLORIDE 9 MG/ML
INJECTION, SOLUTION INTRAVENOUS CONTINUOUS
Status: DISCONTINUED | OUTPATIENT
Start: 2020-07-06 | End: 2020-07-07

## 2020-07-06 RX ORDER — PROMETHAZINE HYDROCHLORIDE 25 MG/1
12.5 TABLET ORAL EVERY 6 HOURS PRN
Status: DISCONTINUED | OUTPATIENT
Start: 2020-07-06 | End: 2020-07-11 | Stop reason: HOSPADM

## 2020-07-06 RX ADMIN — IOPAMIDOL 100 ML: 755 INJECTION, SOLUTION INTRAVENOUS at 10:29

## 2020-07-06 RX ADMIN — ACETAMINOPHEN 650 MG: 325 TABLET ORAL at 20:55

## 2020-07-06 RX ADMIN — Medication 10 ML: at 10:27

## 2020-07-06 RX ADMIN — SODIUM CHLORIDE 70 ML: 9 INJECTION, SOLUTION INTRAVENOUS at 10:28

## 2020-07-06 RX ADMIN — SODIUM CHLORIDE: 9 INJECTION, SOLUTION INTRAVENOUS at 18:04

## 2020-07-06 RX ADMIN — ENOXAPARIN SODIUM 40 MG: 40 INJECTION SUBCUTANEOUS at 18:04

## 2020-07-06 ASSESSMENT — ENCOUNTER SYMPTOMS
CONSTIPATION: 0
BACK PAIN: 0
CHOKING: 0
TROUBLE SWALLOWING: 0
SINUS PRESSURE: 0
ABDOMINAL PAIN: 0
ABDOMINAL PAIN: 0
WHEEZING: 0
DIARRHEA: 0
VOICE CHANGE: 0
WHEEZING: 0
RHINORRHEA: 0
SHORTNESS OF BREATH: 1
CONSTIPATION: 0
BACK PAIN: 0
SHORTNESS OF BREATH: 1
CHEST TIGHTNESS: 0
NAUSEA: 1
VOMITING: 0
DIARRHEA: 0
VOMITING: 0
NAUSEA: 0
COUGH: 1
SORE THROAT: 0
BLOOD IN STOOL: 0

## 2020-07-06 ASSESSMENT — PAIN SCALES - GENERAL: PAINLEVEL_OUTOF10: 2

## 2020-07-06 NOTE — ED NOTES
Access called for hospitalist at [facility].       Isabel Aguiar RN  07/06/20 1776

## 2020-07-06 NOTE — ED NOTES
Pt denies pain, denies feelings of SOB. Updated awaiting bed placement. Pt remains on oxygen at 2L/NC.       Yee Josue RN  07/06/20 1763

## 2020-07-06 NOTE — ED NOTES
Pt falls asleep and oxygen sat drops to 86-87% on RA.  Pt placed back on 2L/NC      Roly Angela RN  07/06/20 8079

## 2020-07-06 NOTE — ED PROVIDER NOTES
Negative for rash. Neurological: Positive for light-headedness. Negative for dizziness, syncope, weakness and headaches. Hematological: Negative for adenopathy. Does not bruise/bleed easily. Psychiatric/Behavioral: Negative for confusion and suicidal ideas. PAST MEDICAL HISTORY    has a past medical history of GERD (gastroesophageal reflux disease), Headache(784.0), Hyperlipidemia, Hypertension, Morbid obesity with BMI of 45.0-49.9, adult [facility]), Patient in clinical research study, and Unspecified sleep apnea. SURGICAL HISTORY      has a past surgical history that includes Dental surgery. CURRENT MEDICATIONS       Discharge Medication List as of 7/6/2020  3:11 PM      CONTINUE these medications which have NOT CHANGED    Details   amLODIPine (NORVASC) 10 MG tablet Take 10 mg by mouth dailyHistorical Med      ibuprofen (ADVIL;MOTRIN) 800 MG tablet Take 1 tablet by mouth every 8 hours as needed for Pain, Disp-30 tablet, R-0Print      fluticasone (FLONASE) 50 MCG/ACT nasal spray 1 spray by Each Nare route daily, Disp-1 Bottle, R-0Print      lisinopril-hydrochlorothiazide (PRINZIDE;ZESTORETIC) 20-25 MG per tablet Take 1 tablet by mouth daily, Disp-30 tablet, R-3             ALLERGIES     is allergic to pcn [penicillins]. FAMILY HISTORY     He indicated that the status of his mother is unknown. He indicated that the status of his father is unknown. He indicated that the status of his maternal grandmother is unknown.     family history includes Cancer in his maternal grandmother; Diabetes in his mother; High Blood Pressure in his father. SOCIAL HISTORY      reports that he has never smoked. He has never used smokeless tobacco. He reports current alcohol use. He reports that he does not use drugs. PHYSICAL EXAM     INITIAL VITALS:  height is 6' (1.829 m) and weight is 340 lb (154.2 kg) (abnormal). His oral temperature is 98.8 °F (37.1 °C).  His blood pressure is 148/99 (abnormal) and his pulse is 80. His respiration is 22 and oxygen saturation is 97%. Physical Exam  Constitutional:       General: He is not in acute distress. Appearance: He is well-developed. He is obese. He is not ill-appearing, toxic-appearing or diaphoretic. HENT:      Head: Normocephalic and atraumatic. Right Ear: External ear normal.      Left Ear: External ear normal.   Eyes:      Pupils: Pupils are equal, round, and reactive to light. Neck:      Musculoskeletal: Normal range of motion and neck supple. Cardiovascular:      Rate and Rhythm: Normal rate and regular rhythm. Pulmonary:      Effort: Pulmonary effort is normal.      Breath sounds: Normal breath sounds. Abdominal:      General: Bowel sounds are normal.      Palpations: Abdomen is soft. Musculoskeletal: Normal range of motion. Skin:     General: Skin is warm and dry. Neurological:      Mental Status: He is alert and oriented to person, place, and time.    Psychiatric:         Behavior: Behavior normal.           DIFFERENTIAL DIAGNOSIS/ MDM:     Dyspnea will do a work-up he is not febrile or toxic appearing may be heat related will rule out PE as well    DIAGNOSTIC RESULTS     EKG: All EKG's are interpreted by the Emergency Department Physician who either signs or Co-signs this chart in the absence of a cardiologist.    Normal sinus rhythm at 90 bpm IL was 156 ms QRS duration is 98 ms QT corrected 531 ms axis is -36 giving him a left axis deviation he has LVH no other acute changes are noted parison was made to a previous EKG    RADIOLOGY:   Non-plain film images such as CT, Ultrasound and MRI are read by the radiologist. Plain radiographic images are visualized and the radiologist interpretations are reviewed as follows:        EXAMINATION:   TWO XRAY VIEWS OF THE CHEST       7/6/2020 9:09 am       COMPARISON:   29 August 2017       HISTORY:   ORDERING SYSTEM PROVIDED HISTORY: shortness of breath   TECHNOLOGIST PROVIDED HISTORY:   shortness of breath   Reason for Exam: SOB   Acuity: Acute   Type of Exam: Initial       FINDINGS:   AP upright view of the chest time stamped at 925 hours demonstrates overlying   cardiac monitoring electrodes, cardiomegaly, and perihilar interstitial   prominence with medial bibasilar areas of opacity which may be related to   vascular congestion or pneumonitis.  No extrapleural air is noted.  No   mediastinal shift is seen.  No gross effusions are evident.  Osseous   structures are age-appropriate.  Mediastinal contours are unremarkable.           Impression   Cardiomegaly, and perihilar interstitial prominence with medial bibasilar   opacities which may be related to pulmonary vascular congestion with   interstitial edema versus pneumonitis.             LABS:  Results for orders placed or performed during the hospital encounter of 07/06/20   Culture, Blood 1   Result Value Ref Range    Specimen Description . BLOOD     Special Requests LEFT ANTECUBE 20CC     Culture NO GROWTH 22 HOURS    Culture, Blood 1   Result Value Ref Range    Specimen Description . BLOOD     Special Requests RIGHT ANTECUBE 20CC     Culture NO GROWTH 22 HOURS    CBC Auto Differential   Result Value Ref Range    WBC 4.7 3.5 - 11.0 k/uL    RBC 5.03 4.5 - 5.9 m/uL    Hemoglobin 14.4 13.5 - 17.5 g/dL    Hematocrit 44.4 41 - 53 %    MCV 88.2 80 - 100 fL    MCH 28.7 26 - 34 pg    MCHC 32.5 31 - 37 g/dL    RDW 14.7 12.5 - 15.4 %    Platelets 941 147 - 504 k/uL    MPV 9.3 6.0 - 12.0 fL    NRBC Automated NOT REPORTED per 100 WBC    Differential Type NOT REPORTED     Seg Neutrophils 61 36 - 66 %    Lymphocytes 27 24 - 44 %    Monocytes 11 2 - 11 %    Eosinophils % 0 (L) 1 - 4 %    Basophils 1 0 - 2 %    Immature Granulocytes NOT REPORTED 0 %    Segs Absolute 2.90 1.8 - 7.7 k/uL    Absolute Lymph # 1.30 1.0 - 4.8 k/uL    Absolute Mono # 0.50 0.1 - 1.2 k/uL    Absolute Eos # 0.00 0.0 - 0.4 k/uL    Basophils Absolute 0.00 0.0 - 0.2 k/uL    Absolute Immature Granulocyte NOT REPORTED 0.00 - 0.30 k/uL    WBC Morphology NOT REPORTED     RBC Morphology NOT REPORTED     Platelet Estimate NOT REPORTED    Comprehensive Metabolic Panel   Result Value Ref Range    Glucose 123 (H) 70 - 99 mg/dL    BUN 16 6 - 20 mg/dL    CREATININE 1.50 (H) 0.70 - 1.20 mg/dL    Bun/Cre Ratio NOT REPORTED 9 - 20    Calcium 8.0 (L) 8.6 - 10.4 mg/dL    Sodium 139 135 - 144 mmol/L    Potassium 3.1 (L) 3.7 - 5.3 mmol/L    Chloride 101 98 - 107 mmol/L    CO2 27 20 - 31 mmol/L    Anion Gap 11 9 - 17 mmol/L    Alkaline Phosphatase 56 40 - 129 U/L    ALT 24 5 - 41 U/L    AST 27 <40 U/L    Total Bilirubin 0.50 0.3 - 1.2 mg/dL    Total Protein 6.7 6.4 - 8.3 g/dL    Alb 3.5 3.5 - 5.2 g/dL    Albumin/Globulin Ratio 1.1 1.0 - 2.5    GFR Non-African American 52 (L) >60 mL/min    GFR African American >60 >60 mL/min    GFR Comment          GFR Staging NOT REPORTED    Troponin   Result Value Ref Range    Troponin, High Sensitivity 23 (H) 0 - 22 ng/L    Troponin T NOT REPORTED <0.03 ng/mL    Troponin Interp NOT REPORTED    D-Dimer, Quantitative   Result Value Ref Range    D-Dimer, Quant 0.40 mg/L FEU   Troponin   Result Value Ref Range    Troponin, High Sensitivity 21 0 - 22 ng/L    Troponin T NOT REPORTED <0.03 ng/mL    Troponin Interp NOT REPORTED    Brain Natriuretic Peptide   Result Value Ref Range    Pro- (H) <300 pg/mL    BNP Interpretation Pro-BNP Reference Range:    COVID-19   Result Value Ref Range    SARS-CoV-2          SARS-CoV-2, Rapid DETECTED (A) Not Detected    Source . NASOPHARYNGEAL SWAB     SARS-CoV-2, PCR         Lactic Acid   Result Value Ref Range    Lactic Acid 0.8 0.5 - 2.2 mmol/L   Ferritin   Result Value Ref Range    Ferritin 164 30 - 400 ug/L   Procalcitonin   Result Value Ref Range    Procalcitonin 0.18 (H) <0.09 ng/mL   EKG 12 Lead   Result Value Ref Range    Ventricular Rate 90 BPM    Atrial Rate 90 BPM    P-R Interval 156 ms    QRS Duration 98 ms    Q-T Interval 426 ms    QTc Calculation (Bazett) 521 ms    P Axis 17 degrees    R Axis -36 degrees    T Axis 68 degrees   EKG 12 Lead   Result Value Ref Range    Ventricular Rate 81 BPM    Atrial Rate 81 BPM    P-R Interval 162 ms    QRS Duration 98 ms    Q-T Interval 416 ms    QTc Calculation (Bazett) 483 ms    P Axis 15 degrees    R Axis -34 degrees    T Axis 40 degrees       Positive COVID    EMERGENCY DEPARTMENT COURSE:   Vitals:    Vitals:    07/06/20 1104 07/06/20 1143 07/06/20 1325 07/06/20 1500   BP:  (!) 157/100  (!) 148/99   Pulse:    80   Resp:       Temp:       TempSrc:       SpO2: (!) 87% 97% 96% 97%   Weight:       Height:         -------------------------  BP: (!) 148/99, Temp: 98.8 °F (37.1 °C), Pulse: 80, Resp: 22          CONSULTS:  Discussed with hospitalist at [facility] they will accept him in transfer    PROCEDURES:  None    FINAL IMPRESSION      1. COVID-19    2. Hypoxia          DISPOSITION/PLAN   Transferred to [facility] 108 TO:  No follow-up provider specified. DISCHARGE MEDICATIONS:  Discharge Medication List as of 7/6/2020  3:11 PM          (Please note that portions of this note were completed with a voice recognition program.  Efforts were made to edit the dictations but occasionally words are mis-transcribed.)    Goodrich MD, F.A.A.E.M.   Attending Emergency Medicine Physician      Juan Ramon Gonzalez MD  07/07/20 1255

## 2020-07-06 NOTE — H&P
[address] [facility] [place]      HISTORY AND PHYSICAL EXAMINATION            Date:   7/6/2020  Patient name:  Cindy Nick  Date of admission:  7/6/2020  3:34 PM  MRN:   [id number]  Account:  [de-identified]  YOB: 1980  PCP:    Loli Ashley MD  Room:   5423/0285-38  Code Status:    Full Code    Chief Complaint:     Dizziness  Nausea   Shortness of breath    History Obtained From:     patient, electronic medical record    History of Present Illness: The patient is a 38 y.o. Non-/non  male who presents with No chief complaint on file. and he is admitted to the hospital for the management of  Pneumonia due to COVID-19 virus. Patient to emergency room at [facility] with dizziness, shortness of breath, fatigue. Symptoms have been present for 2 day. He took a day off yesterday but symptoms worsen worsening. Patient denies any exposure to COVID-19 patient. 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 Patient is non-smoker and drinks socially. Evaluation at emergency room showed temperature 98.8, heart rate 80, respiratory 22, blood pressure 148/99. Saturation dropped down to 86% on room air. Lab evaluation showed hypokalemia 3.1, lactic acid 0.8, proBNP 318, WBC 4.7. Chest x-ray was suggestive of perihilar interstitial prominence with medial bibasilar opacity concerning for pulmonary venous congestion versus interstitial edema/pneumonitis. CT chest was negative for PE and showed patchy opacities in right lung concerning for COVID-19 infection. Rapid COVID-19 test was positive. Patient has underlying history of morbid obesity, sleep apnea, hypertension.     Past Medical History:     Past Medical History:   Diagnosis Date    GERD (gastroesophageal reflux disease)     Headache(784.0)     Hyperlipidemia     Hypertension     Morbid obesity with BMI of 45.0-49.9, adult (HCC)     Unspecified sleep apnea     Noncompliant with CPAP        Past Surgical History:     Past Surgical History:   Procedure Laterality Date    DENTAL SURGERY          Medications Prior to Admission:     Prior to Admission medications    Medication Sig Start Date End Date Taking? Authorizing Provider   amLODIPine (NORVASC) 10 MG tablet Take 10 mg by mouth daily    Historical Provider, MD   ibuprofen (ADVIL;MOTRIN) 800 MG tablet Take 1 tablet by mouth every 8 hours as needed for Pain 7/1/19   TORIBIO Flowers CNP   fluticasone [facility]) 50 MCG/ACT nasal spray 1 spray by Each Nare route daily 3/31/19   TORIBIO Nava CNP   lisinopril-hydrochlorothiazide (PRINZIDE;ZESTORETIC) 20-25 MG per tablet Take 1 tablet by mouth daily 6/8/15   Oxana Azul MD        Allergies:     Pcn [penicillins]    Social History:     Tobacco:    reports that he has never smoked. He has never used smokeless tobacco.  Alcohol:      reports current alcohol use. Drug Use:  reports no history of drug use. Family History:     Family History   Problem Relation Age of Onset    Diabetes Mother     High Blood Pressure Father     Cancer Maternal Grandmother        Review of Systems:     Positive and Negative as described in HPI. Review of Systems   Constitutional: Positive for activity change, appetite change, chills and fatigue. Negative for fever and unexpected weight change. HENT: Negative for congestion, nosebleeds, rhinorrhea, sinus pressure, sneezing and voice change. Respiratory: Positive for cough and shortness of breath. Negative for choking, chest tightness and wheezing. Cardiovascular: Negative for chest pain, palpitations and leg swelling. Gastrointestinal: Negative for abdominal pain, constipation, diarrhea, nausea and vomiting. Genitourinary: Negative for difficulty urinating, discharge, dysuria, frequency and testicular pain. Musculoskeletal: Negative for back pain. Skin: Negative for rash. Neurological: Negative for dizziness, weakness, light-headedness and headaches. Hematological: Does not bruise/bleed easily. Psychiatric/Behavioral: Negative for agitation, behavioral problems, confusion, self-injury, sleep disturbance and suicidal ideas. Physical Exam:   BP (!) 145/98   Pulse 89   Temp 99.2 °F (37.3 °C) (Oral)   Resp 20   Ht 6' (1.829 m)   Wt (!) 366 lb 10 oz (166.3 kg)   SpO2 95%   BMI 49.72 kg/m²   Temp (24hrs), Av °F (37.2 °C), Min:98.8 °F (37.1 °C), Max:99.2 °F (37.3 °C)    No results for input(s): POCGLU in the last 72 hours. No intake or output data in the 24 hours ending 20 1721  Physical Exam  Vitals signs and nursing note reviewed. Constitutional:       General: He is not in acute distress. Appearance: He is morbidly obese. He is ill-appearing. He is not diaphoretic. Interventions: Nasal cannula in place. HENT:      Head: Normocephalic and atraumatic. Nose:      Right Sinus: No maxillary sinus tenderness or frontal sinus tenderness. Left Sinus: No maxillary sinus tenderness or frontal sinus tenderness. Mouth/Throat:      Pharynx: No oropharyngeal exudate. Eyes:      General: No scleral icterus. Conjunctiva/sclera: Conjunctivae normal.      Pupils: Pupils are equal, round, and reactive to light. Neck:      Musculoskeletal: Full passive range of motion without pain and neck supple. Thyroid: No thyromegaly. Vascular: No JVD. Cardiovascular:      Rate and Rhythm: Normal rate and regular rhythm. Pulses:           Dorsalis pedis pulses are 2+ on the right side and 2+ on the left side. Heart sounds: Normal heart sounds. No murmur. Pulmonary:      Effort: Pulmonary effort is normal.      Breath sounds: Normal breath sounds. No wheezing or rales. Abdominal:      Palpations: Abdomen is soft. There is no mass. Tenderness: There is no abdominal tenderness. Lymphadenopathy:      Head:      Right side of head: No submandibular adenopathy.       Left side of head: No submandibular Ratio 1.1 1.0 - 2.5    GFR Non-African American 52 (L) >60 mL/min    GFR African American >60 >60 mL/min    GFR Comment          GFR Staging NOT REPORTED    Troponin    Collection Time: 07/06/20  8:15 AM   Result Value Ref Range    Troponin, High Sensitivity 23 (H) 0 - 22 ng/L    Troponin T NOT REPORTED <0.03 ng/mL    Troponin Interp NOT REPORTED    D-Dimer, Quantitative    Collection Time: 07/06/20  8:15 AM   Result Value Ref Range    D-Dimer, Quant 0.40 mg/L FEU   Brain Natriuretic Peptide    Collection Time: 07/06/20  8:15 AM   Result Value Ref Range    Pro- (H) <300 pg/mL    BNP Interpretation Pro-BNP Reference Range:    Ferritin    Collection Time: 07/06/20  8:15 AM   Result Value Ref Range    Ferritin 164 30 - 400 ug/L   Procalcitonin    Collection Time: 07/06/20  8:15 AM   Result Value Ref Range    Procalcitonin 0.18 (H) <0.09 ng/mL   EKG 12 Lead    Collection Time: 07/06/20  8:15 AM   Result Value Ref Range    Ventricular Rate 90 BPM    Atrial Rate 90 BPM    P-R Interval 156 ms    QRS Duration 98 ms    Q-T Interval 426 ms    QTc Calculation (Bazett) 521 ms    P Axis 17 degrees    R Axis -36 degrees    T Axis 68 degrees   EKG 12 Lead    Collection Time: 07/06/20 10:10 AM   Result Value Ref Range    Ventricular Rate 81 BPM    Atrial Rate 81 BPM    P-R Interval 162 ms    QRS Duration 98 ms    Q-T Interval 416 ms    QTc Calculation (Bazett) 483 ms    P Axis 15 degrees    R Axis -34 degrees    T Axis 40 degrees   Troponin    Collection Time: 07/06/20 10:23 AM   Result Value Ref Range    Troponin, High Sensitivity 21 0 - 22 ng/L    Troponin T NOT REPORTED <0.03 ng/mL    Troponin Interp NOT REPORTED    COVID-19    Collection Time: 07/06/20 11:27 AM   Result Value Ref Range    SARS-CoV-2          SARS-CoV-2, Rapid DETECTED (A) Not Detected    Source . NASOPHARYNGEAL SWAB     SARS-CoV-2, PCR         Lactic Acid    Collection Time: 07/06/20 11:34 AM   Result Value Ref Range    Lactic Acid 0.8 0.5 - 2.2 mmol/L Culture, Blood 1    Collection Time: 07/06/20 11:34 AM   Result Value Ref Range    Specimen Description . BLOOD     Special Requests RIGHT ANTECUBE 20CC     Culture NO GROWTH 2 HOURS    Culture, Blood 1    Collection Time: 07/06/20 11:57 AM   Result Value Ref Range    Specimen Description . BLOOD     Special Requests LEFT ANTECUBE 20CC     Culture NO GROWTH 2 HOURS        Imaging/Diagonstics:    Xr Chest Standard (2 Vw)    Result Date: 7/6/2020  Cardiomegaly, and perihilar interstitial prominence with medial bibasilar opacities which may be related to pulmonary vascular congestion with interstitial edema versus pneumonitis. Ct Chest Pulmonary Embolism W Contrast    Result Date: 7/6/2020  1. Motion artifact degrades this evaluation. No evidence for central pulmonary embolism. 2.  Patchy opacities in the right lung base and dependent atelectasis. Underlying inflammatory process or infection cannot be excluded in the appropriate clinical setting. No convincing evidence for edema on this exam.        Assessment :      Primary Problem  Pneumonia due to COVID-19 virus    Active Hospital Problems    Diagnosis Date Noted    Hypoxia [R09.02] 07/06/2020    Acute respiratory failure with hypoxia ([address] [J96.01] 07/06/2020    Pneumonia due to COVID-19 virus [U07.1, J12.89] 07/06/2020    Morbid obesity ([address] [E66.01] 07/06/2020    JOE (obstructive sleep apnea) [G47.33] 10/29/2013       Plan:     Patient status Admit as inpatient in the  Progressive Unit/Step down    1. Pneumonia due to COVID-19 infection-droplet plus isolation. ID consult.: will Be candidate for remedesivir. 2. Acute respiratory failure with hypoxia secondary #1 -oxygen supplementation by nasal cannula. 3. Morbid obesity  4. Obstructive sleep apnea -noncompliant with CPAP  5.  Essential hypertension -controlled with lisinopril    Consultations:   None    Patient is admitted as inpatient status because of co-morbidities listed above, severity of signs and symptoms as outlined, requirement for current medical therapies and most importantly because of direct risk to patient if care not provided in a hospital setting.     Christopher Stallings MD  7/6/2020    Copy sent to Dr. Jeimy Vo MD    (Please note that portions of this note were completed with a voice recognition program. Efforts were made to edit the dictations but occasionally words are mis-transcribed.)

## 2020-07-06 NOTE — CONSULTS
[facility] - Initial Consult Note  Today's Date and Time: 7/6/2020, 5:47 PM    Impression :   · COVID-19 pneumonia right lower lobe  · Shortness of breath with underlying hypoxia  · Nausea  · Dizziness  · Morbid obesity  · History of obstructive sleep apnea    Recommendations:   · Monitor off antibiotics  · Will have clinical research discuss potential modalities of treatment for COVID with patient    Medical Decision Making/Summary/Discussion:7/6/2020     ·   Infection Control Recommendations   · [place] Precautions  · Contact Isolation   · Airborne isolation  · Droplet Isolation    Antimicrobial Stewardship Recommendations     · Discontinuation of therapy  Coordination of Outpatient Care:   · Estimated Length of IV antimicrobials: None  · Patient will need Midline Catheter Insertion: No  · Patient will need PICC line Insertion: No  · Patient will need: Home IV , [place],  SNF,  LTAC: To be determined  Patient will need outpatient wound care: No  Chief complaint/reason for consultation:   · COVID-19 pneumonia      History of Present Illness:   Vicente Ventura is a 38y.o.-year-old  male who was initially admitted on 7/6/2020. Patient seen at the request of Dr. Alba Aguilar. INITIAL HISTORY:    Patient presented through ER at [facility] with complaints of progressive shortness of the breath, fatigue, dizziness and nausea. Dated the symptoms have been present for about 2 days. The patient indicated that he took a day off from work yesterday but despite the above he continued to worsen. 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 Denies any issues concerning smoking. Indicates that he drinks on a social basis. He has a past history of morbid obesity, essential hypertension, and obstructive sleep apnea. Examination showed the patient to have a respiratory rate of 22 with a heart rate of 80 and temperature 98.8.   He was hypoxic with an oxygen saturation of 86% on room air. The chest x-ray's showed a perihilar interstitial prominence with some bibasilar opacities which raise the question of venous congestion versus interstitial edema versus pneumonitis. His chest CT on 7/6/2020 showed some infiltrates at the right base. Because of the progressive nature of his symptoms COVID-19 was suspected. A rapid COVID-19 test was positive. Patient was directed for admission to the [facility]. Labs, X rays reviewed: 7/6/2020    BUN: 16  Cr: 1.50    WBC: 4.7  Hb: 14.4  Plat: 199    Cultures:  Urine:  ·   Blood:  · 7/6/2020:   Sputum :  ·   Wound:  ·     COVID-19 test:  · 7/6/2020-positive    Discussed with patient, RN, STEPHANIE. I have personally reviewed the past medical history, past surgical history, medications, social history, and family history, and I have updated the database accordingly.   Past Medical History:     Past Medical History:   Diagnosis Date    GERD (gastroesophageal reflux disease)     Headache(784.0)     Hyperlipidemia     Hypertension     Morbid obesity with BMI of 45.0-49.9, adult ([facility])     Unspecified sleep apnea     Noncompliant with CPAP       Past Surgical  History:     Past Surgical History:   Procedure Laterality Date    DENTAL SURGERY         Medications:      amLODIPine  10 mg Oral Daily    lisinopril-hydroCHLOROthiazide  1 tablet Oral Daily    sodium chloride flush  10 mL Intravenous 2 times per day    enoxaparin  40 mg Subcutaneous Daily       Social History:     Social History     Socioeconomic History    Marital status: Single     Spouse name: Not on file    Number of children: Not on file    Years of education: Not on file    Highest education level: Not on file   Occupational History    Not on file   Social Needs    Financial resource strain: Not on file    Food insecurity     Worry: Not on file     Inability: Not on file    Transportation needs     Medical: Not on file     Non-medical: Not on file   Tobacco Use    Smoking status: Never Smoker    Smokeless tobacco: Never Used   Substance and Sexual Activity    Alcohol use: Yes     Alcohol/week: 0.0 standard drinks     Comment: socially    Drug use: No    Sexual activity: Not on file   Lifestyle    Physical activity     Days per week: Not on file     Minutes per session: Not on file    Stress: Not on file   Relationships    Social connections     Talks on phone: Not on file     Gets together: Not on file     Attends Gnosticist service: Not on file     Active member of club or organization: Not on file     Attends meetings of clubs or organizations: Not on file     Relationship status: Not on file    Intimate partner violence     Fear of current or ex partner: Not on file     Emotionally abused: Not on file     Physically abused: Not on file     Forced sexual activity: Not on file   Other Topics Concern    Not on file   Social History Narrative    Not on file       Family History:     Family History   Problem Relation Age of Onset    Diabetes Mother     High Blood Pressure Father     Cancer Maternal Grandmother         Allergies:   Pcn [penicillins]     Review of Systems:   Constitutional: No fevers or chills. Fatigue, malaise  Head: No headaches  Eyes: No double vision or blurry vision. No conjunctival inflammation. ENT: No sore throat or runny nose. . No hearing loss, tinnitus or vertigo. Cardiovascular: No chest pain or palpitations. Shortness of breath. GUTIERREZ  Lung: Shortness of breath, no cough. No sputum production  Abdomen: No nausea, vomiting, diarrhea, or abdominal pain. Alexander Campos No cramps. Genitourinary: No increased urinary frequency, or dysuria. No hematuria. No suprapubic or CVA pain  Musculoskeletal: No muscle aches or pains. No joint effusions, swelling or deformities  Hematologic: No bleeding or bruising. Neurologic: No headache, weakness, numbness, or tingling. Integument: No rash, no ulcers. Psychiatric: No depression. Endocrine: No polyuria, no polydipsia, no polyphagia. Physical Examination :     Patient Vitals for the past 8 hrs:   BP Temp Temp src Pulse Resp SpO2 Height Weight   07/06/20 1541 (!) 145/98 99.2 °F (37.3 °C) Oral 89 20 95 % 6' (1.829 m) (!) 366 lb 10 oz (166.3 kg)     General Appearance: Awake, alert, and in apparent distress. Obese  Head:  Normocephalic, no trauma  Eyes: Pupils equal, round, reactive to light and accommodation; extraocular movements intact; sclera anicteric; conjunctivae pink. No embolic phenomena. ENT: Oropharynx clear, without erythema, exudate, or thrush. No tenderness of sinuses. Mouth/throat: mucosa pink and moist. No lesions. Dentition in good repair. He is receiving oxygen by nasal cannula  Neck:Supple, without lymphadenopathy. Thyroid normal, No bruits. Pulmonary/Chest: Clear to auscultation, without wheezes, rales, or rhonchi. No dullness to percussion. Cardiovascular: Regular rate and rhythm without murmurs, rubs, or gallops. Abdomen: Soft, non tender. Bowel sounds normal. No organomegaly. Protuberant  All four Extremities: No cyanosis, clubbing, edema, or effusions. Neurologic: No gross sensory or motor deficits. Skin: Warm and dry with good turgor. No signs of peripheral arterial or venous insufficiency. No ulcerations. No open wounds. Medical Decision Making -Laboratory:   I have independently reviewed/ordered the following labs:    CBC with Differential:   Recent Labs     07/06/20  0815   WBC 4.7   HGB 14.4   HCT 44.4      LYMPHOPCT 27   MONOPCT 11     BMP:   Recent Labs     07/06/20  0815      K 3.1*      CO2 27   BUN 16   CREATININE 1.50*     Hepatic Function Panel:   Recent Labs     07/06/20  0815   PROT 6.7   LABALBU 3.5   BILITOT 0.50   ALKPHOS 56   ALT 24   AST 27     No results for input(s): RPR in the last 72 hours. No results for input(s): HIV in the last 72 hours. No results for input(s): BC in the last 72 hours.   Lab Results Component Value Date    MUCUS NOT REPORTED 07/01/2019    RBC 5.03 07/06/2020    TRICHOMONAS NOT REPORTED 07/01/2019    WBC 4.7 07/06/2020    YEAST NOT REPORTED 07/01/2019    TURBIDITY CLEAR 07/01/2019     Lab Results   Component Value Date    CREATININE 1.50 07/06/2020    GLUCOSE 123 07/06/2020       Medical Decision Making-Imaging:     EXAMINATION:   CTA OF THE CHEST 7/6/2020 10:21 am       TECHNIQUE:   CTA of the chest was performed after the administration of intravenous   contrast.  Multiplanar reformatted images are provided for review.  MIP   images are provided for review. Dose modulation, iterative reconstruction,   and/or weight based adjustment of the mA/kV was utilized to reduce the   radiation dose to as low as reasonably achievable.       COMPARISON:   Chest radiograph today.       HISTORY:   ORDERING SYSTEM PROVIDED HISTORY: Chest Pain   TECHNOLOGIST PROVIDED HISTORY:   Chest Pain   Reason for Exam: Increasing SOB, low O2 sats.    Acuity: Acute   Type of Exam: Initial       FINDINGS:   Pulmonary Arteries: Appropriate contrast opacification of the pulmonary   arteries.  Motion artifact degrades evaluation however.  No evidence for   central pulmonary embolism.  Limited evaluation of the segmental branches.       Mediastinum: No evidence of mediastinal lymphadenopathy.  The heart and   pericardium demonstrate no acute abnormality.  There is no acute abnormality   of the thoracic aorta.       Lungs/pleura: Respiratory motion artifact and expiratory phase of imaging   noted.  Patchy opacities in the right lung base and dependent atelectasis are   noted.  No convincing evidence for edema.  No significant effusion.  The   central airway is patent.       Upper Abdomen: Findings suggestive of hepatic steatosis.       Soft Tissues/Bones: No acute bone or soft tissue abnormality.           Impression   1.  Motion artifact degrades this evaluation.  No evidence for central   pulmonary embolism.       2.  Patchy opacities in the right lung base and dependent atelectasis. Underlying inflammatory process or infection cannot be excluded in the   appropriate clinical setting.  No convincing evidence for edema on this exam.                 Medical Decision [id number]:       Medical Decision Making-Other:     Note:  · Labs, medications, radiologic studies were reviewed with personal review of films  · Large amounts of data were reviewed  · Discussed with nursing Staff, Discharge planner  · Infection Control and Prevention measures reviewed  · All prior entries were reviewed  · Administer medications as ordered  · Prognosis: Guarded  · Discharge planning reviewed  · Follow up as outpatient. Thank you for allowing us to participate in the care of this patient. Please call with questions.     Michi Kumar MD  Pager: [phone] - Office: [phone]

## 2020-07-06 NOTE — ED NOTES
Pt c/o \"just feel tired but not dizzy anymore\". Pt updated awaiting 2 hour EKG and troponin. IV of NS from squad infusing.       Krunal Gamble RN  07/06/20 9760

## 2020-07-07 LAB
ABO/RH: NORMAL
ABSOLUTE EOS #: <0.03 K/UL (ref 0–0.44)
ABSOLUTE IMMATURE GRANULOCYTE: <0.03 K/UL (ref 0–0.3)
ABSOLUTE LYMPH #: 0.8 K/UL (ref 1.1–3.7)
ABSOLUTE MONO #: 0.22 K/UL (ref 0.1–1.2)
ANION GAP SERPL CALCULATED.3IONS-SCNC: 12 MMOL/L (ref 9–17)
BASOPHILS # BLD: 0 % (ref 0–2)
BASOPHILS ABSOLUTE: <0.03 K/UL (ref 0–0.2)
BUN BLDV-MCNC: 11 MG/DL (ref 6–20)
BUN/CREAT BLD: ABNORMAL (ref 9–20)
CALCIUM SERPL-MCNC: 7.6 MG/DL (ref 8.6–10.4)
CHLORIDE BLD-SCNC: 101 MMOL/L (ref 98–107)
CO2: 26 MMOL/L (ref 20–31)
CREAT SERPL-MCNC: 0.91 MG/DL (ref 0.7–1.2)
DIFFERENTIAL TYPE: ABNORMAL
EOSINOPHILS RELATIVE PERCENT: 0 % (ref 1–4)
GFR AFRICAN AMERICAN: >60 ML/MIN
GFR NON-AFRICAN AMERICAN: >60 ML/MIN
GFR SERPL CREATININE-BSD FRML MDRD: ABNORMAL ML/MIN/{1.73_M2}
GFR SERPL CREATININE-BSD FRML MDRD: ABNORMAL ML/MIN/{1.73_M2}
GLUCOSE BLD-MCNC: 161 MG/DL (ref 70–99)
HCT VFR BLD CALC: 45.9 % (ref 40.7–50.3)
HEMOGLOBIN: 14.4 G/DL (ref 13–17)
IMMATURE GRANULOCYTES: 0 %
LYMPHOCYTES # BLD: 22 % (ref 24–43)
MAGNESIUM: 2 MG/DL (ref 1.6–2.6)
MCH RBC QN AUTO: 28.6 PG (ref 25.2–33.5)
MCHC RBC AUTO-ENTMCNC: 31.4 G/DL (ref 28.4–34.8)
MCV RBC AUTO: 91.3 FL (ref 82.6–102.9)
MONOCYTES # BLD: 6 % (ref 3–12)
NRBC AUTOMATED: 0 PER 100 WBC
PDW BLD-RTO: 13.5 % (ref 11.8–14.4)
PLATELET # BLD: 173 K/UL (ref 138–453)
PLATELET ESTIMATE: ABNORMAL
PMV BLD AUTO: 11 FL (ref 8.1–13.5)
POTASSIUM SERPL-SCNC: 3.4 MMOL/L (ref 3.7–5.3)
RBC # BLD: 5.03 M/UL (ref 4.21–5.77)
RBC # BLD: ABNORMAL 10*6/UL
SEG NEUTROPHILS: 72 % (ref 36–65)
SEGMENTED NEUTROPHILS ABSOLUTE COUNT: 2.64 K/UL (ref 1.5–8.1)
SODIUM BLD-SCNC: 139 MMOL/L (ref 135–144)
WBC # BLD: 3.7 K/UL (ref 3.5–11.3)
WBC # BLD: ABNORMAL 10*3/UL

## 2020-07-07 PROCEDURE — 99233 SBSQ HOSP IP/OBS HIGH 50: CPT | Performed by: INTERNAL MEDICINE

## 2020-07-07 PROCEDURE — 2580000003 HC RX 258: Performed by: NURSE PRACTITIONER

## 2020-07-07 PROCEDURE — 6360000002 HC RX W HCPCS: Performed by: INTERNAL MEDICINE

## 2020-07-07 PROCEDURE — 83735 ASSAY OF MAGNESIUM: CPT

## 2020-07-07 PROCEDURE — 6370000000 HC RX 637 (ALT 250 FOR IP): Performed by: NURSE PRACTITIONER

## 2020-07-07 PROCEDURE — 80048 BASIC METABOLIC PNL TOTAL CA: CPT

## 2020-07-07 PROCEDURE — 85025 COMPLETE CBC W/AUTO DIFF WBC: CPT

## 2020-07-07 PROCEDURE — 6360000002 HC RX W HCPCS: Performed by: NURSE PRACTITIONER

## 2020-07-07 PROCEDURE — 1200000000 HC SEMI PRIVATE

## 2020-07-07 PROCEDURE — 86900 BLOOD TYPING SEROLOGIC ABO: CPT

## 2020-07-07 PROCEDURE — 6370000000 HC RX 637 (ALT 250 FOR IP): Performed by: INTERNAL MEDICINE

## 2020-07-07 PROCEDURE — 86901 BLOOD TYPING SEROLOGIC RH(D): CPT

## 2020-07-07 RX ORDER — FUROSEMIDE 20 MG/1
20 TABLET ORAL 2 TIMES DAILY
Status: DISCONTINUED | OUTPATIENT
Start: 2020-07-07 | End: 2020-07-11 | Stop reason: HOSPADM

## 2020-07-07 RX ORDER — 0.9 % SODIUM CHLORIDE 0.9 %
20 INTRAVENOUS SOLUTION INTRAVENOUS ONCE
Status: DISCONTINUED | OUTPATIENT
Start: 2020-07-07 | End: 2020-07-11 | Stop reason: HOSPADM

## 2020-07-07 RX ORDER — METHYLPREDNISOLONE SODIUM SUCCINATE 40 MG/ML
40 INJECTION, POWDER, LYOPHILIZED, FOR SOLUTION INTRAMUSCULAR; INTRAVENOUS EVERY 12 HOURS
Status: DISCONTINUED | OUTPATIENT
Start: 2020-07-07 | End: 2020-07-11 | Stop reason: HOSPADM

## 2020-07-07 RX ADMIN — METHYLPREDNISOLONE SODIUM SUCCINATE 40 MG: 40 INJECTION, POWDER, FOR SOLUTION INTRAMUSCULAR; INTRAVENOUS at 22:40

## 2020-07-07 RX ADMIN — FUROSEMIDE 20 MG: 20 TABLET ORAL at 17:56

## 2020-07-07 RX ADMIN — ACETAMINOPHEN 650 MG: 325 TABLET ORAL at 04:17

## 2020-07-07 RX ADMIN — SODIUM CHLORIDE, PRESERVATIVE FREE 10 ML: 5 INJECTION INTRAVENOUS at 22:19

## 2020-07-07 RX ADMIN — ENOXAPARIN SODIUM 40 MG: 40 INJECTION SUBCUTANEOUS at 22:19

## 2020-07-07 RX ADMIN — METHYLPREDNISOLONE SODIUM SUCCINATE 40 MG: 40 INJECTION, POWDER, FOR SOLUTION INTRAMUSCULAR; INTRAVENOUS at 14:44

## 2020-07-07 RX ADMIN — LISINOPRIL AND HYDROCHLOROTHIAZIDE 1 TABLET: 25; 20 TABLET ORAL at 08:50

## 2020-07-07 RX ADMIN — POTASSIUM CHLORIDE 40 MEQ: 1500 TABLET, EXTENDED RELEASE ORAL at 14:44

## 2020-07-07 RX ADMIN — AMLODIPINE BESYLATE 10 MG: 10 TABLET ORAL at 08:50

## 2020-07-07 RX ADMIN — ENOXAPARIN SODIUM 40 MG: 40 INJECTION SUBCUTANEOUS at 08:50

## 2020-07-07 RX ADMIN — SODIUM CHLORIDE: 9 INJECTION, SOLUTION INTRAVENOUS at 04:14

## 2020-07-07 ASSESSMENT — PAIN SCALES - GENERAL: PAINLEVEL_OUTOF10: 8

## 2020-07-07 NOTE — PROGRESS NOTES
Enoch [address] 18    Progress Note    7/7/2020    11:47 AM    Name:   Shahram Garcia  MRN:     [id number]     Acct:      [de-identified]   Room:   [address] [place] [address] Day:  1  Admit Date:  7/6/2020  3:34 PM    PCP:   Levar Lei MD  Code Status:  Full Code    Subjective:     C/C: No chief complaint on file. Interval History Status: not changed. Patient seen and examined  Patient feels weak short of breath  Patient denies fever chest pain  I am seeing the patient for shortness of breath    Brief History:         Medications: Allergies: Allergies   Allergen Reactions    Pcn [Penicillins]        Current Meds:   Scheduled Meds:    amLODIPine  10 mg Oral Daily    lisinopril-hydroCHLOROthiazide  1 tablet Oral Daily    sodium chloride flush  10 mL Intravenous 2 times per day    enoxaparin  40 mg Subcutaneous Daily     Continuous Infusions:    sodium chloride 100 mL/hr at 07/07/20 0414     PRN Meds: sodium chloride flush, potassium chloride **OR** potassium alternative oral replacement **OR** potassium chloride, magnesium sulfate, acetaminophen **OR** acetaminophen, promethazine **OR** ondansetron, bisacodyl    Data:     Past Medical History:   has a past medical history of GERD (gastroesophageal reflux disease), Headache(784.0), Hyperlipidemia, Hypertension, Morbid obesity with BMI of 45.0-49.9, adult ([address] and Unspecified sleep apnea. Social History:   reports that he has never smoked. He has never used smokeless tobacco. He reports current alcohol use. He reports that he does not use drugs.      Family History:   Family History   Problem Relation Age of Onset    Diabetes Mother     High Blood Pressure Father     Cancer Maternal Grandmother        Vitals:  BP (!) 137/55   Pulse 88   Temp 98.5 °F (36.9 °C) (Axillary)   Resp 16   Ht 6' (1.829 m)   Wt (!) 366 lb 10 oz (166.3 kg)   SpO2 100%   BMI 49.72 kg/m²   Temp (24hrs), Av.7 °F (37.6 °C), Min:98.5 °F (36.9 °C), Max:100.9 °F (38.3 °C)    No results for input(s): POCGLU in the last 72 hours. I/O (24Hr): Intake/Output Summary (Last 24 hours) at 2020 1147  Last data filed at 2020 1105  Gross per 24 hour   Intake 1386 ml   Output 1350 ml   Net 36 ml       Labs:  Hematology:  Recent Labs     20  0815 20  0941   WBC 4.7 3.7   RBC 5.03 5.03   HGB 14.4 14.4   HCT 44.4 45.9   MCV 88.2 91.3   MCH 28.7 28.6   MCHC 32.5 31.4   RDW 14.7 13.5    173   MPV 9.3 11.0   DDIMER 0.40  --      Chemistry:  Recent Labs     20  0815 20  1023 20  0941     --  139   K 3.1*  --  3.4*     --  101   CO2 27  --  26   GLUCOSE 123*  --  161*   BUN 16  --  11   CREATININE 1.50*  --  0.91   MG  --   --  2.0   ANIONGAP 11  --  12   LABGLOM 52*  --  >60   GFRAA >60  --  >60   CALCIUM 8.0*  --  7.6*   PROBNP 318*  --   --    TROPHS 23* 21  --      Recent Labs     20  0815   PROT 6.7   LABALBU 3.5   AST 27   ALT 24   ALKPHOS 56   BILITOT 0.50     ABG:No results found for: POCPH, PHART, PH, POCPCO2, VXC9IPO, PCO2, POCPO2, PO2ART, PO2, POCHCO3, MYP2GMC, HCO3, NBEA, PBEA, BEART, BE, THGBART, THB, VSJ5UUU, XFWT2YEZ, S0WVGIYB, O2SAT, FIO2  Lab Results   Component Value Date/Time    SPECIAL [facility] 2020 11:57 AM     Lab Results   Component Value Date/Time    CULTURE NO GROWTH 19 HOURS 2020 11:57 AM       Radiology:  Ratna Duffy Chest Standard (2 Vw)    Result Date: 2020  Cardiomegaly, and perihilar interstitial prominence with medial bibasilar opacities which may be related to pulmonary vascular congestion with interstitial edema versus pneumonitis. Ct Chest Pulmonary Embolism W Contrast    Result Date: 2020  1. Motion artifact degrades this evaluation. No evidence for central pulmonary embolism. 2.  Patchy opacities in the right lung base and dependent atelectasis.  Underlying inflammatory process or infection cannot be excluded in the appropriate clinical setting.   No convincing evidence for edema on this exam.       Physical Examination:        General appearance:  alert, cooperative and no distress  Mental Status:  oriented to person, place and time and normal affect  Lungs: Decreased air entry bilateral short of breath  Heart:  regular rate and rhythm, no murmur  Abdomen:  soft, nontender, nondistended, normal bowel sounds, no masses, hepatomegaly, splenomegaly  Extremities:  no edema, redness, tenderness in the calves  Skin:  no gross lesions, rashes, induration    Assessment:        Hospital Problems           Last Modified POA    * (Principal) Pneumonia due to COVID-19 virus 7/6/2020 Yes    JOE (obstructive sleep apnea) (Chronic) 7/6/2020 Yes    Hypoxia 7/6/2020 Yes    Acute respiratory failure with hypoxia ([address] 7/6/2020 Yes    Morbid obesity with BMI of 45.0-49.9, adult ([address] 7/6/2020 Yes          Plan:          CO VID 19 pneumonia final ID evaluation pending      Severe sepsis secondary to CO VID 19 pneumonia patient on oxygen  Patient had hypoxia fever tachypnea and tachycardia    Obstructive sleep apnea continue home medication    Arianna Solis MD  7/7/2020  11:47 AM

## 2020-07-07 NOTE — PROGRESS NOTES
Pharmacy Note  COVID-19 Anticoagulation Adjustment    Joan Haque is a 38 y.o. male. Pharmacist assessment of anticoagulation in a SARS-CoV-2 positive patient. Recent Labs     07/06/20  0815 07/07/20  0941   BUN 16 11       Recent Labs     07/06/20  0815 07/07/20  0941   CREATININE 1.50* 0.91     Recent Labs     07/06/20  0815   DDIMER 0.40        Estimated Creatinine Clearance: 171 mL/min (based on SCr of 0.91 mg/dL). Height:   Ht Readings from Last 1 Encounters:   07/06/20 6' (1.829 m)     Weight:  Wt Readings from Last 1 Encounters:   07/06/20 (!) 366 lb 10 oz (166.3 kg)     BMI:  Body mass index is 49.72 kg/m².       Per the [facility] Anticoagulation Algorithm for COVID-19 positive or clinically-suspected patients, the following adjustment has been made per P&T Guidelines:             Lovenox 40mg daily to BID    Thank you,  Richie Juarez, PharmD BCPS  7/7/2020 1:19 PM

## 2020-07-07 NOTE — CARE COORDINATION
TRANSITIONAL CARE PLANNING/ 2 [facility]: 1    Reason for Admission: Hypoxia [R09.02]     Treatment Plan of Care:IM, ID following     Tests/Procedures still needed:2 units convalescent  plasma    Barriers to [address]      Readmission Risk              Risk of Unplanned Readmission:        7            Patient goals/Treatment Preferences/Transitional Plan:home independently     Referrals Made: none    Follow Up needed: No. Spoke with pt and he denies any home care needs.  Watch O2 needs ED ED

## 2020-07-07 NOTE — RESEARCH
[address]      Patient Name: Mary Alice Carias  MRN: [id number]  [place]: 1980  Date of evaluation: 7/7/2020  Time of evaluation: 09:45  Reason for evaluation:  Screening     A sked by Dr. Sujatha Chew to evaluate the patient for Expanded Access to Convalescent Plasma for the Treatment of Patients with COVID-19     Protocol # [id number]     IND# [id number]       NCT# [id number]  :  Felipe Paula MD through the [facility] IRB    [x]  Patient met eligibility criteria  [x]  Dr. Sujatha Chew spoke with the patientt last night about potentially being in the research study. I called into the room and talked to the patient at 09:50 related to the study. The Patient read study consent. At 10:40 am I contacted the subjects. Questions were answered. He wanted to be in enrolled into the study. I went to the unit. The patient signed the Consents at 10:45. [x]  A copy of the signed consents given to the patient/family  [x]  Blood bank notified  [x]  Patient educated on study medication indication and side effects. [x]  Patient verbalizes understanding. [x]  The patient will receive 2 units of ABO compatible COVID-19 convalescent plasma over 1-2 hours.      Glen Garcia RN            Clinical Research Nurse  For questions page or perfect serve Dr. Sujatha Chew, Dr. Yang Alcantara or Dr. Orlando Perez or Dr. Fartun Villegas or the research nurse at [phone]    Ankit Lubin MD

## 2020-07-07 NOTE — PLAN OF CARE
Problem: Airway Clearance - Ineffective  Goal: Achieve or maintain patent airway  7/7/2020 0142 by Orestes Fernandez RN  Outcome: Ongoing  7/6/2020 1824 by Lorenzo Brower RN  Outcome: Ongoing     Problem:  Body Temperature -  Risk of, Imbalanced  Goal: Ability to maintain a body temperature within defined limits  7/7/2020 0142 by Orestes Fernandez RN  Outcome: Ongoing  7/6/2020 1824 by Lorenzo Brower RN  Outcome: Ongoing     Problem: Isolation Precautions - Risk of Spread of Infection  Goal: Prevent transmission of infection  7/7/2020 0142 by Orestes Fernandez RN  Outcome: Ongoing  7/6/2020 1824 by Lorenzo Brower RN  Outcome: Ongoing     Problem: Falls - Risk of:  Goal: Will remain free from falls  Description: Will remain free from falls  7/7/2020 0142 by Orestes Fernandez RN  Outcome: Ongoing  7/6/2020 1824 by Lorenzo Brower RN  Outcome: Ongoing     Problem: Falls - Risk of:  Goal: Absence of physical injury  Description: Absence of physical injury  7/7/2020 0142 by Orestes Fernandez RN  Outcome: Ongoing  7/6/2020 1824 by Lorenzo Brower RN  Outcome: Ongoing

## 2020-07-08 LAB
ABSOLUTE EOS #: 0 K/UL (ref 0–0.4)
ABSOLUTE IMMATURE GRANULOCYTE: 0 K/UL (ref 0–0.3)
ABSOLUTE LYMPH #: 0.62 K/UL (ref 1–4.8)
ABSOLUTE MONO #: 0.22 K/UL (ref 0.1–0.8)
BASOPHILS # BLD: 0 % (ref 0–2)
BASOPHILS ABSOLUTE: 0 K/UL (ref 0–0.2)
BLD PROD TYP BPU: NORMAL
BLD PROD TYP BPU: NORMAL
DIFFERENTIAL TYPE: ABNORMAL
DISPENSE STATUS BLOOD BANK: NORMAL
DISPENSE STATUS BLOOD BANK: NORMAL
EOSINOPHILS RELATIVE PERCENT: 0 % (ref 1–4)
HCT VFR BLD CALC: 44.5 % (ref 40.7–50.3)
HEMOGLOBIN: 14.2 G/DL (ref 13–17)
IMMATURE GRANULOCYTES: 0 %
LYMPHOCYTES # BLD: 14 % (ref 24–44)
MCH RBC QN AUTO: 28.6 PG (ref 25.2–33.5)
MCHC RBC AUTO-ENTMCNC: 31.9 G/DL (ref 28.4–34.8)
MCV RBC AUTO: 89.7 FL (ref 82.6–102.9)
MONOCYTES # BLD: 5 % (ref 1–7)
MORPHOLOGY: NORMAL
NRBC AUTOMATED: 0 PER 100 WBC
PDW BLD-RTO: 13.7 % (ref 11.8–14.4)
PLATELET # BLD: 164 K/UL (ref 138–453)
PLATELET ESTIMATE: ABNORMAL
PMV BLD AUTO: 11.6 FL (ref 8.1–13.5)
RBC # BLD: 4.96 M/UL (ref 4.21–5.77)
RBC # BLD: ABNORMAL 10*6/UL
SEG NEUTROPHILS: 81 % (ref 36–66)
SEGMENTED NEUTROPHILS ABSOLUTE COUNT: 3.56 K/UL (ref 1.8–7.7)
TRANSFUSION STATUS: NORMAL
TRANSFUSION STATUS: NORMAL
UNIT DIVISION: 0
UNIT DIVISION: 0
UNIT NUMBER: NORMAL
UNIT NUMBER: NORMAL
WBC # BLD: 4.4 K/UL (ref 3.5–11.3)
WBC # BLD: ABNORMAL 10*3/UL

## 2020-07-08 PROCEDURE — 99232 SBSQ HOSP IP/OBS MODERATE 35: CPT | Performed by: INTERNAL MEDICINE

## 2020-07-08 PROCEDURE — 2580000003 HC RX 258: Performed by: NURSE PRACTITIONER

## 2020-07-08 PROCEDURE — 6370000000 HC RX 637 (ALT 250 FOR IP): Performed by: NURSE PRACTITIONER

## 2020-07-08 PROCEDURE — 85025 COMPLETE CBC W/AUTO DIFF WBC: CPT

## 2020-07-08 PROCEDURE — 99233 SBSQ HOSP IP/OBS HIGH 50: CPT | Performed by: INTERNAL MEDICINE

## 2020-07-08 PROCEDURE — 6370000000 HC RX 637 (ALT 250 FOR IP): Performed by: INTERNAL MEDICINE

## 2020-07-08 PROCEDURE — 6360000002 HC RX W HCPCS: Performed by: INTERNAL MEDICINE

## 2020-07-08 PROCEDURE — 1200000000 HC SEMI PRIVATE

## 2020-07-08 PROCEDURE — 6360000002 HC RX W HCPCS: Performed by: NURSE PRACTITIONER

## 2020-07-08 RX ADMIN — ACETAMINOPHEN 650 MG: 325 TABLET ORAL at 12:14

## 2020-07-08 RX ADMIN — FUROSEMIDE 20 MG: 20 TABLET ORAL at 17:59

## 2020-07-08 RX ADMIN — METHYLPREDNISOLONE SODIUM SUCCINATE 40 MG: 40 INJECTION, POWDER, FOR SOLUTION INTRAMUSCULAR; INTRAVENOUS at 09:15

## 2020-07-08 RX ADMIN — SODIUM CHLORIDE, PRESERVATIVE FREE 10 ML: 5 INJECTION INTRAVENOUS at 09:36

## 2020-07-08 RX ADMIN — FUROSEMIDE 20 MG: 20 TABLET ORAL at 09:00

## 2020-07-08 RX ADMIN — BISACODYL 5 MG: 5 TABLET, COATED ORAL at 09:15

## 2020-07-08 RX ADMIN — AMLODIPINE BESYLATE 10 MG: 10 TABLET ORAL at 09:15

## 2020-07-08 RX ADMIN — ENOXAPARIN SODIUM 40 MG: 40 INJECTION SUBCUTANEOUS at 09:15

## 2020-07-08 ASSESSMENT — PAIN SCALES - GENERAL
PAINLEVEL_OUTOF10: 0
PAINLEVEL_OUTOF10: 8
PAINLEVEL_OUTOF10: 9
PAINLEVEL_OUTOF10: 0
PAINLEVEL_OUTOF10: 0
PAINLEVEL_OUTOF10: 9

## 2020-07-08 ASSESSMENT — PAIN DESCRIPTION - PAIN TYPE
TYPE: ACUTE PAIN

## 2020-07-08 ASSESSMENT — PAIN DESCRIPTION - LOCATION
LOCATION: HEAD
LOCATION: HEAD

## 2020-07-08 NOTE — CARE COORDINATION
TRANSITIONAL CARE PLANNING/ 2 [facility]: 2    Reason for Admission: Hypoxia [R09.02]     Treatment Plan of Care:IM, ID following , had 2 units convalescent plasma yesterday    Tests/Procedures still needed:O2 weaning     Barriers to Discharge:High O2 needs,  Simple mask @ 7L/NC    Readmission Risk              Risk of Unplanned Readmission:        8            Patient goals/Treatment Preferences/Transitional Plan: Home    Referrals Made: none    Follow Up needed:None so far. Pt planning on going home w/out HC.  Probable home O2 eval before D/C

## 2020-07-08 NOTE — PROGRESS NOTES
[facility] - Progress Note    Today's Date and Time: 7/8/2020, 6:18 PM    Impression :   · COVID-19 pneumonia right lower lobe  · Shortness of breath with underlying hypoxia  · Nausea  · Dizziness  · Morbid obesity  · History of obstructive sleep apnea    Recommendations:   · Monitor off antibiotics  · Patient consented to receive immune plasma for treatment of the COVID-19 infection    Medical Decision Making/Summary/Discussion:7/8/2020     ·   Infection Control Recommendations   · [place] Precautions  · Contact Isolation   · Airborne isolation  · Droplet Isolation    Antimicrobial Stewardship Recommendations     · Discontinuation of therapy  Coordination of Outpatient Care:   · Estimated Length of IV antimicrobials: None  · Patient will need Midline Catheter Insertion: No  · Patient will need PICC line Insertion: No  · Patient will need: Home IV , [place],  SNF,  LTAC: To be determined  Patient will need outpatient wound care: No  Chief complaint/reason for consultation:   · COVID-19 pneumonia      History of Present Illness:   Georgie Lacey is a 38y.o.-year-old  male who was initially admitted on 7/6/2020. Patient seen at the request of Dr. Mark Martines. INITIAL HISTORY:    Patient presented through ER at [facility] with complaints of progressive shortness of the breath, fatigue, dizziness and nausea. Dated the symptoms have been present for about 2 days. The patient indicated that he took a day off from work yesterday but despite the above he continued to worsen. 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 Denies any issues concerning smoking. Indicates that he drinks on a social basis. He has a past history of morbid obesity, essential hypertension, and obstructive sleep apnea. Examination showed the patient to have a respiratory rate of 22 with a heart rate of 80 and temperature 98.8.   He was hypoxic with an oxygen saturation of 86% on room air. The chest x-ray's showed a perihilar interstitial prominence with some bibasilar opacities which raise the question of venous congestion versus interstitial edema versus pneumonitis. His chest CT on 7/6/2020 showed some infiltrates at the right base. Because of the progressive nature of his symptoms COVID-19 was suspected. A rapid COVID-19 test was positive. Patient was directed for admission to the [facility]. CURRENT EVALUATION :7/8/2020     Patient evaluated and examined in the ICU. Afebrile  VS stable    Patient remains short of breath  Cates he is fatigued. feels weak    Options for treatment were discussed with him by the clinical research unit. Patient gave consent to receive convalescent plasma. 2 units of plasma were requested. He was also approached about potential enrollment in the \[facility]\"" study protocol. Patient considered the protocol but ultimately declined it. Labs, X rays reviewed: 7/8/2020    BUN: 16-->11  Cr: 1.50-->0.91    WBC: 4.7-->3.7  Hb: 14.4  Plat: 199-->173    Cultures:  Urine:  ·   Blood:  · 7/6/2020:   Sputum :  ·   Wound:  ·     COVID-19 test:  · 7/6/2020-positive    Discussed with patient, RN, IM. I have personally reviewed the past medical history, past surgical history, medications, social history, and family history, and I have updated the database accordingly.   Past Medical History:     Past Medical History:   Diagnosis Date    GERD (gastroesophageal reflux disease)     Headache(784.0)     Hyperlipidemia     Hypertension     Morbid obesity with BMI of 45.0-49.9, adult ([facility] [address]     Patient in clinical research study 07/07/2020    Convalescent plasma study Expected Jamaal Forte of completion 8/7/20    Unspecified sleep apnea     Noncompliant with CPAP       Past Surgical  History:     Past Surgical History:   Procedure Laterality Date    DENTAL SURGERY         Medications:      furosemide  20 mg Oral BID    methylPREDNISolone  40 mg Intravenous Q12H    sodium chloride  20 mL Intravenous Once    enoxaparin  40 mg Subcutaneous BID    amLODIPine  10 mg Oral Daily    sodium chloride flush  10 mL Intravenous 2 times per day       Social History:     Social History     Socioeconomic History    Marital status: Single     Spouse name: Not on file    Number of children: Not on file    Years of education: Not on file    Highest education level: Not on file   Occupational History    Not on file   Social Needs    Financial resource strain: Not on file    Food insecurity     Worry: Not on file     Inability: Not on file    Transportation needs     Medical: Not on file     Non-medical: Not on file   Tobacco Use    Smoking status: Never Smoker    Smokeless tobacco: Never Used   Substance and Sexual Activity    Alcohol use: Yes     Alcohol/week: 0.0 standard drinks     Comment: socially    Drug use: No    Sexual activity: Not on file   Lifestyle    Physical activity     Days per week: Not on file     Minutes per session: Not on file    Stress: Not on file   Relationships    Social connections     Talks on phone: Not on file     Gets together: Not on file     Attends [employer] service: Not on file     Active member of club or organization: Not on file     Attends meetings of clubs or organizations: Not on file     Relationship status: Not on file    Intimate partner violence     Fear of current or ex partner: Not on file     Emotionally abused: Not on file     Physically abused: Not on file     Forced sexual activity: Not on file   Other Topics Concern    Not on file   Social History Narrative    Not on file       Family History:     Family History   Problem Relation Age of Onset    Diabetes Mother     High Blood Pressure Father     Cancer Maternal Grandmother         Allergies:   Pcn [penicillins]     Review of Systems:   Constitutional: No fevers or chills.   Fatigue, malaise  Head: No headaches  Eyes: No double vision or blurry vision. No conjunctival inflammation. ENT: No sore throat or runny nose. . No hearing loss, tinnitus or vertigo. Cardiovascular: No chest pain or palpitations. Shortness of breath. GUTIERREZ  Lung: Shortness of breath, no cough. No sputum production  Abdomen: No nausea, vomiting, diarrhea, or abdominal pain. Mirna Staggers No cramps. Genitourinary: No increased urinary frequency, or dysuria. No hematuria. No suprapubic or CVA pain  Musculoskeletal: No muscle aches or pains. No joint effusions, swelling or deformities  Hematologic: No bleeding or bruising. Neurologic: No headache, weakness, numbness, or tingling. Integument: No rash, no ulcers. Psychiatric: No depression. Endocrine: No polyuria, no polydipsia, no polyphagia. Physical Examination :     Patient Vitals for the past 8 hrs:   BP Temp Temp src Pulse Resp SpO2   07/08/20 1600 128/60 99 °F (37.2 °C) Oral 94 23 92 %   07/08/20 1219 (!) 149/96 -- -- 95 20 94 %     General Appearance: Awake, alert, and in apparent distress. Obese  Head:  Normocephalic, no trauma  Eyes: Pupils equal, round, reactive to light and accommodation; extraocular movements intact; sclera anicteric; conjunctivae pink. No embolic phenomena. ENT: Oropharynx clear, without erythema, exudate, or thrush. No tenderness of sinuses. Mouth/throat: mucosa pink and moist. No lesions. Dentition in good repair. He is receiving oxygen by nasal cannula  Neck:Supple, without lymphadenopathy. Thyroid normal, No bruits. Pulmonary/Chest: Clear to auscultation, without wheezes, rales, or rhonchi. No dullness to percussion. Cardiovascular: Regular rate and rhythm without murmurs, rubs, or gallops. Abdomen: Soft, non tender. Bowel sounds normal. No organomegaly. Protuberant  All four Extremities: No cyanosis, clubbing, edema, or effusions. Neurologic: No gross sensory or motor deficits. Skin: Warm and dry with good turgor. No signs of peripheral arterial or venous insufficiency. No ulcerations.  No open wounds. Medical Decision Making -Laboratory:   I have independently reviewed/ordered the following labs:    CBC with Differential:   Recent Labs     07/07/20  0941 07/08/20  0436   WBC 3.7 4.4   HGB 14.4 14.2   HCT 45.9 44.5    164   LYMPHOPCT 22* 14*   MONOPCT 6 5     BMP:   Recent Labs     07/06/20  0815 07/07/20  0941    139   K 3.1* 3.4*    101   CO2 27 26   BUN 16 11   CREATININE 1.50* 0.91   MG  --  2.0     Hepatic Function Panel:   Recent Labs     07/06/20  0815   PROT 6.7   LABALBU 3.5   BILITOT 0.50   ALKPHOS 56   ALT 24   AST 27     No results for input(s): RPR in the last 72 hours. No results for input(s): HIV in the last 72 hours. No results for input(s): BC in the last 72 hours. Lab Results   Component Value Date    MUCUS NOT REPORTED 07/01/2019    RBC 4.96 07/08/2020    TRICHOMONAS NOT REPORTED 07/01/2019    WBC 4.4 07/08/2020    YEAST NOT REPORTED 07/01/2019    TURBIDITY CLEAR 07/01/2019     Lab Results   Component Value Date    CREATININE 0.91 07/07/2020    GLUCOSE 161 07/07/2020       Medical Decision Making-Imaging:     EXAMINATION:   CTA OF THE CHEST 7/6/2020 10:21 am       TECHNIQUE:   CTA of the chest was performed after the administration of intravenous   contrast.  Multiplanar reformatted images are provided for review.  MIP   images are provided for review. Dose modulation, iterative reconstruction,   and/or weight based adjustment of the mA/kV was utilized to reduce the   radiation dose to as low as reasonably achievable.       COMPARISON:   Chest radiograph today.       HISTORY:   ORDERING SYSTEM PROVIDED HISTORY: Chest Pain   TECHNOLOGIST PROVIDED HISTORY:   Chest Pain   Reason for Exam: Increasing SOB, low O2 sats.    Acuity: Acute   Type of Exam: Initial       FINDINGS:   Pulmonary Arteries: Appropriate contrast opacification of the pulmonary   arteries.  Motion artifact degrades evaluation however.  No evidence for   central pulmonary embolism.  Limited evaluation of the segmental branches.       Mediastinum: No evidence of mediastinal lymphadenopathy.  The heart and   pericardium demonstrate no acute abnormality.  There is no acute abnormality   of the thoracic aorta.       Lungs/pleura: Respiratory motion artifact and expiratory phase of imaging   noted.  Patchy opacities in the right lung base and dependent atelectasis are   noted.  No convincing evidence for edema.  No significant effusion.  The   central airway is patent.       Upper Abdomen: Findings suggestive of hepatic steatosis.       Soft Tissues/Bones: No acute bone or soft tissue abnormality.           Impression   1.  Motion artifact degrades this evaluation.  No evidence for central   pulmonary embolism.       2.  Patchy opacities in the right lung base and dependent atelectasis. Underlying inflammatory process or infection cannot be excluded in the   appropriate clinical setting.  No convincing evidence for edema on this exam.                 Medical Decision Qxuswc-Guzuewya-Ayood:       Medical Decision Making-Other:     Note:  · Labs, medications, radiologic studies were reviewed with personal review of films  · Large amounts of data were reviewed  · Discussed with nursing Staff, Discharge planner  · Infection Control and Prevention measures reviewed  · All prior entries were reviewed  · Administer medications as ordered  · Prognosis: Guarded  · Discharge planning reviewed  · Follow up as outpatient. Thank you for allowing us to participate in the care of this patient. Please call with questions.     Whitney Elizondo MD  Pager: [phone] - Office: [phone]

## 2020-07-08 NOTE — PROGRESS NOTES
Enoch Cheney 18    Progress Note    7/8/2020    10:52 AM    Name:   Georgie Lacey  MRN:     [id number]     Acct:      [de-identified]   Room:   [address] [place] [address] Day:  2  Admit Date:  7/6/2020  3:34 PM    PCP:   Mani Del Toro MD  Code Status:  Full Code    Subjective:     C/C: No chief complaint on file. Interval History Status: not changed. Patient seen and examined    Patient feels weak short of breath received plasma tolerated well  Patient denies fever chest pain    I am seeing the patient for shortness of breath      Medications: Allergies: Allergies   Allergen Reactions    Pcn [Penicillins]        Current Meds:   Scheduled Meds:    furosemide  20 mg Oral BID    methylPREDNISolone  40 mg Intravenous Q12H    sodium chloride  20 mL Intravenous Once    enoxaparin  40 mg Subcutaneous BID    amLODIPine  10 mg Oral Daily    sodium chloride flush  10 mL Intravenous 2 times per day     Continuous Infusions:     PRN Meds: sodium chloride flush, potassium chloride **OR** potassium alternative oral replacement **OR** potassium chloride, magnesium sulfate, acetaminophen **OR** acetaminophen, promethazine **OR** ondansetron, bisacodyl    Data:     Past Medical History:   has a past medical history of GERD (gastroesophageal reflux disease), Headache(784.0), Hyperlipidemia, Hypertension, Morbid obesity with BMI of 45.0-49.9, adult [facility]), Patient in clinical research study, and Unspecified sleep apnea. Social History:   reports that he has never smoked. He has never used smokeless tobacco. He reports current alcohol use. He reports that he does not use drugs.      Family History:   Family History   Problem Relation Age of Onset    Diabetes Mother     High Blood Pressure Father     Cancer Maternal Grandmother        Vitals:  /62   Pulse 85   Temp 97.3 °F (36.3 °C) (Oral)   Resp 22   Ht 6' (1.829 m)   Wt (!) 366 lb 10 oz (166.3 kg)   SpO2 99%   BMI 49.72 kg/m²   Temp (24hrs), Av.5 °F (37.5 °C), Min:97.3 °F (36.3 °C), Max:100.9 °F (38.3 °C)    No results for input(s): POCGLU in the last 72 hours. I/O (24Hr): Intake/Output Summary (Last 24 hours) at 2020 1052  Last data filed at 2020 0425  Gross per 24 hour   Intake 1185 ml   Output 2650 ml   Net -1465 ml       Labs:  Hematology:  Recent Labs     20  0815 20  0941 20  0436   WBC 4.7 3.7 4.4   RBC 5.03 5.03 4.96   HGB 14.4 14.4 14.2   HCT 44.4 45.9 44.5   MCV 88.2 91.3 89.7   MCH 28.7 28.6 28.6   MCHC 32.5 31.4 31.9   RDW 14.7 13.5 13.7    173 164   MPV 9.3 11.0 11.6   DDIMER 0.40  --   --      Chemistry:  Recent Labs     20  0815 20  1023 20  0941     --  139   K 3.1*  --  3.4*     --  101   CO2 27  --  26   GLUCOSE 123*  --  161*   BUN 16  --  11   CREATININE 1.50*  --  0.91   MG  --   --  2.0   ANIONGAP 11  --  12   LABGLOM 52*  --  >60   GFRAA >60  --  >60   CALCIUM 8.0*  --  7.6*   PROBNP 318*  --   --    TROPHS 23* 21  --      Recent Labs     20  0815   PROT 6.7   LABALBU 3.5   AST 27   ALT 24   ALKPHOS 56   BILITOT 0.50     ABG:No results found for: POCPH, PHART, PH, POCPCO2, TAJ0NVP, PCO2, POCPO2, PO2ART, PO2, POCHCO3, YKH5WUM, HCO3, NBEA, PBEA, BEART, BE, THGBART, THB, RVR4SVT, UYDL4GQV, G9WMUSWI, O2SAT, FIO2  Lab Results   Component Value Date/Time    SPECIAL LEFT [facility] 2020 11:57 AM     Lab Results   Component Value Date/Time    CULTURE NO GROWTH 2 DAYS 2020 11:57 AM       Radiology:  Katelyn Esparza Chest Standard (2 Vw)    Result Date: 2020  Cardiomegaly, and perihilar interstitial prominence with medial bibasilar opacities which may be related to pulmonary vascular congestion with interstitial edema versus pneumonitis. Ct Chest Pulmonary Embolism W Contrast    Result Date: 2020  1. Motion artifact degrades this evaluation. No evidence for central pulmonary embolism.  2. Patchy opacities in the right lung base and dependent atelectasis. Underlying inflammatory process or infection cannot be excluded in the appropriate clinical setting.   No convincing evidence for edema on this exam.       Physical Examination:        General appearance:  alert, cooperative and no distress  Mental Status:  oriented to person, place and time and normal affect  Lungs: Decreased air entry bilateral short of breath  Heart:  regular rate and rhythm, no murmur  Abdomen:  soft, nontender, nondistended, normal bowel sounds, no masses, hepatomegaly, splenomegaly  Extremities:  no edema, redness, tenderness in the calves  Skin:  no gross lesions, rashes, induration    Assessment:        Hospital Problems           Last Modified POA    * (Principal) Pneumonia due to COVID-19 virus 7/6/2020 Yes    JOE (obstructive sleep apnea) (Chronic) 7/6/2020 Yes    Hypoxia 7/6/2020 Yes    Acute respiratory failure with hypoxia ([address] 7/6/2020 Yes    Morbid obesity with BMI of 45.0-49.9, adult ([address] 7/6/2020 Yes          Plan:          CO VID 19 pneumonia patient tolerated plasma transfusion very well    Severe sepsis secondary to CO VID 19 pneumonia patient on oxygen  Patient had hypoxia fever tachypnea and tachycardia    Obstructive sleep apnea continue home medication        Jacinto Watkins MD  7/8/2020  10:52 AM

## 2020-07-08 NOTE — RESEARCH
[address]          Patient Name: Jose Bush  MRN: [id number]  [place]: 1980  Date of evaluation: 7/8/2020  Time of evaluation: 09:30  Reason for evaluation: 4 hour post transfusion report       Expanded Access to Convalescent Plasma for the Treatment of Patients with COVID-19  Protocol # [id number]     IND# [id number]       NCT# [id number]        [x]  On  at 7/7/2020 the subject received 2 units of ABO compatible COVID-19 convalescent plasma. [x]  Chart reviewed, no adverse reactions were noted from the convalescent plasma. The subject states, he is feeling about the same. He is able to get up and walk around his room without becoming short of breath. He continues to c/o body aches.   This was all reviewed with Dr. Jean Marie Milner  [x]  Subject continues in follow-up     Gunjan Damico RN     Clinical Research Nurse  For questions page or perfect serve Dr. Jean Marie Milner, Dr. Bernardo Lloyd, Dr. Mia Simmonds, Dr. Dalene Kussmaul, Dr Marcus Ayala, Dr. Christina Villegas or contact the research nurse at [phone]    Ralph Chávez MD

## 2020-07-09 LAB
ABSOLUTE EOS #: <0.03 K/UL (ref 0–0.44)
ABSOLUTE IMMATURE GRANULOCYTE: 0.03 K/UL (ref 0–0.3)
ABSOLUTE LYMPH #: 0.75 K/UL (ref 1.1–3.7)
ABSOLUTE MONO #: 0.32 K/UL (ref 0.1–1.2)
ANION GAP SERPL CALCULATED.3IONS-SCNC: 14 MMOL/L (ref 9–17)
BASOPHILS # BLD: 0 % (ref 0–2)
BASOPHILS ABSOLUTE: <0.03 K/UL (ref 0–0.2)
BUN BLDV-MCNC: 9 MG/DL (ref 6–20)
BUN/CREAT BLD: ABNORMAL (ref 9–20)
CALCIUM SERPL-MCNC: 8.4 MG/DL (ref 8.6–10.4)
CHLORIDE BLD-SCNC: 97 MMOL/L (ref 98–107)
CO2: 30 MMOL/L (ref 20–31)
CREAT SERPL-MCNC: 0.77 MG/DL (ref 0.7–1.2)
DIFFERENTIAL TYPE: ABNORMAL
EOSINOPHILS RELATIVE PERCENT: 0 % (ref 1–4)
GFR AFRICAN AMERICAN: >60 ML/MIN
GFR NON-AFRICAN AMERICAN: >60 ML/MIN
GFR SERPL CREATININE-BSD FRML MDRD: ABNORMAL ML/MIN/{1.73_M2}
GFR SERPL CREATININE-BSD FRML MDRD: ABNORMAL ML/MIN/{1.73_M2}
GLUCOSE BLD-MCNC: 160 MG/DL (ref 70–99)
HCT VFR BLD CALC: 46.4 % (ref 40.7–50.3)
HEMOGLOBIN: 14.6 G/DL (ref 13–17)
IMMATURE GRANULOCYTES: 0 %
LYMPHOCYTES # BLD: 9 % (ref 24–43)
MCH RBC QN AUTO: 28 PG (ref 25.2–33.5)
MCHC RBC AUTO-ENTMCNC: 31.5 G/DL (ref 28.4–34.8)
MCV RBC AUTO: 89.1 FL (ref 82.6–102.9)
MONOCYTES # BLD: 4 % (ref 3–12)
NRBC AUTOMATED: 0 PER 100 WBC
PDW BLD-RTO: 13 % (ref 11.8–14.4)
PLATELET # BLD: 186 K/UL (ref 138–453)
PLATELET ESTIMATE: ABNORMAL
PMV BLD AUTO: 11.7 FL (ref 8.1–13.5)
POTASSIUM SERPL-SCNC: 4.1 MMOL/L (ref 3.7–5.3)
RBC # BLD: 5.21 M/UL (ref 4.21–5.77)
RBC # BLD: ABNORMAL 10*6/UL
SEG NEUTROPHILS: 87 % (ref 36–65)
SEGMENTED NEUTROPHILS ABSOLUTE COUNT: 7.27 K/UL (ref 1.5–8.1)
SODIUM BLD-SCNC: 141 MMOL/L (ref 135–144)
WBC # BLD: 8.4 K/UL (ref 3.5–11.3)
WBC # BLD: ABNORMAL 10*3/UL

## 2020-07-09 PROCEDURE — 99233 SBSQ HOSP IP/OBS HIGH 50: CPT | Performed by: INTERNAL MEDICINE

## 2020-07-09 PROCEDURE — 2580000003 HC RX 258: Performed by: NURSE PRACTITIONER

## 2020-07-09 PROCEDURE — 6370000000 HC RX 637 (ALT 250 FOR IP): Performed by: NURSE PRACTITIONER

## 2020-07-09 PROCEDURE — 80048 BASIC METABOLIC PNL TOTAL CA: CPT

## 2020-07-09 PROCEDURE — 99232 SBSQ HOSP IP/OBS MODERATE 35: CPT | Performed by: INTERNAL MEDICINE

## 2020-07-09 PROCEDURE — 85025 COMPLETE CBC W/AUTO DIFF WBC: CPT

## 2020-07-09 PROCEDURE — 6360000002 HC RX W HCPCS: Performed by: INTERNAL MEDICINE

## 2020-07-09 PROCEDURE — 6360000002 HC RX W HCPCS: Performed by: NURSE PRACTITIONER

## 2020-07-09 PROCEDURE — 6370000000 HC RX 637 (ALT 250 FOR IP): Performed by: INTERNAL MEDICINE

## 2020-07-09 PROCEDURE — 1200000000 HC SEMI PRIVATE

## 2020-07-09 RX ADMIN — SODIUM CHLORIDE, PRESERVATIVE FREE 10 ML: 5 INJECTION INTRAVENOUS at 00:04

## 2020-07-09 RX ADMIN — METHYLPREDNISOLONE SODIUM SUCCINATE 40 MG: 40 INJECTION, POWDER, FOR SOLUTION INTRAMUSCULAR; INTRAVENOUS at 09:51

## 2020-07-09 RX ADMIN — FUROSEMIDE 20 MG: 20 TABLET ORAL at 09:51

## 2020-07-09 RX ADMIN — METHYLPREDNISOLONE SODIUM SUCCINATE 40 MG: 40 INJECTION, POWDER, FOR SOLUTION INTRAMUSCULAR; INTRAVENOUS at 21:42

## 2020-07-09 RX ADMIN — ENOXAPARIN SODIUM 40 MG: 40 INJECTION SUBCUTANEOUS at 09:51

## 2020-07-09 RX ADMIN — METHYLPREDNISOLONE SODIUM SUCCINATE 40 MG: 40 INJECTION, POWDER, FOR SOLUTION INTRAMUSCULAR; INTRAVENOUS at 00:03

## 2020-07-09 RX ADMIN — ENOXAPARIN SODIUM 40 MG: 40 INJECTION SUBCUTANEOUS at 21:42

## 2020-07-09 RX ADMIN — ACETAMINOPHEN 650 MG: 325 TABLET ORAL at 00:06

## 2020-07-09 RX ADMIN — FUROSEMIDE 20 MG: 20 TABLET ORAL at 16:50

## 2020-07-09 RX ADMIN — AMLODIPINE BESYLATE 10 MG: 10 TABLET ORAL at 09:51

## 2020-07-09 RX ADMIN — ENOXAPARIN SODIUM 40 MG: 40 INJECTION SUBCUTANEOUS at 00:02

## 2020-07-09 RX ADMIN — SODIUM CHLORIDE, PRESERVATIVE FREE 10 ML: 5 INJECTION INTRAVENOUS at 09:51

## 2020-07-09 RX ADMIN — SODIUM CHLORIDE, PRESERVATIVE FREE 10 ML: 5 INJECTION INTRAVENOUS at 21:43

## 2020-07-09 NOTE — PLAN OF CARE
Problem: Fatigue  Goal: Verbalize increase energy and improved vitality  7/8/2020 1809 by Parth Wilde RN  Outcome: Ongoing     Problem: Patient Education: Go to Patient Education Activity  Goal: Patient/Family Education  7/8/2020 1809 by Parth Wilde RN  Outcome: Ongoing     Problem: Falls - Risk of:  Goal: Will remain free from falls  Description: Will remain free from falls  7/9/2020 0519 by Emely Napoles RN  Outcome: Ongoing  7/8/2020 1809 by Parth Wilde RN  Outcome: Ongoing  Goal: Absence of physical injury  Description: Absence of physical injury  7/9/2020 0519 by Emely Napoles RN  Outcome: Ongoing  7/8/2020 1809 by Parth Wilde RN  Outcome: Ongoing     Problem: Pain:  Goal: Pain level will decrease  Description: Pain level will decrease  7/9/2020 0519 by Emely Napoles RN  Outcome: Ongoing  7/8/2020 1809 by Parth Wilde RN  Outcome: Ongoing  Goal: Control of acute pain  Description: Control of acute pain  7/9/2020 0519 by Emely Napoles RN  Outcome: Ongoing  7/8/2020 1809 by Parth Wilde RN  Outcome: Ongoing  Goal: Control of chronic pain  Description: Control of chronic pain  7/9/2020 0519 by Emely Napoles RN  Outcome: Ongoing  7/8/2020 1809 by Parth Wilde RN  Outcome: Ongoing

## 2020-07-09 NOTE — PROGRESS NOTES
Enoch Cheney 18    Progress Note    7/9/2020    12:33 PM    Name:   Jaime Liu  MRN:     [id number]     Acct:      [de-identified]   Room:   [address] [place] [address] Day:  3  Admit Date:  7/6/2020  3:34 PM    PCP:   Vivek Milton MD  Code Status:  Full Code    Subjective:     C/C: No chief complaint on file. Interval History Status: not changed. Patient seen and examined    Patient feels weak short of breath received plasma tolerated well  Patient currently on 4 L of oxygen he is off nonrebreather  Patient denies fever chest pain    I am seeing the patient for shortness of breath      Medications: Allergies: Allergies   Allergen Reactions    Pcn [Penicillins]        Current Meds:   Scheduled Meds:    furosemide  20 mg Oral BID    methylPREDNISolone  40 mg Intravenous Q12H    sodium chloride  20 mL Intravenous Once    enoxaparin  40 mg Subcutaneous BID    amLODIPine  10 mg Oral Daily    sodium chloride flush  10 mL Intravenous 2 times per day     Continuous Infusions:     PRN Meds: sodium chloride flush, potassium chloride **OR** potassium alternative oral replacement **OR** potassium chloride, magnesium sulfate, acetaminophen **OR** acetaminophen, promethazine **OR** ondansetron, bisacodyl    Data:     Past Medical History:   has a past medical history of GERD (gastroesophageal reflux disease), Headache(784.0), Hyperlipidemia, Hypertension, Morbid obesity with BMI of 45.0-49.9, adult [facility]), Patient in clinical research study, and Unspecified sleep apnea. Social History:   reports that he has never smoked. He has never used smokeless tobacco. He reports current alcohol use. He reports that he does not use drugs.      Family History:   Family History   Problem Relation Age of Onset    Diabetes Mother     High Blood Pressure Father     Cancer Maternal Grandmother        Vitals:  BP (!) 158/97   Pulse 97   Temp 100 °F (37.8 °C) (Oral)   Resp 22   Ht 6' (1.829 m)   Wt (!) 366 lb 10 oz (166.3 kg)   SpO2 96%   BMI 49.72 kg/m²   Temp (24hrs), Av.2 °F (37.9 °C), Min:99 °F (37.2 °C), Max:102.6 °F (39.2 °C)    No results for input(s): POCGLU in the last 72 hours. I/O (24Hr): Intake/Output Summary (Last 24 hours) at 2020 1233  Last data filed at 2020 1039  Gross per 24 hour   Intake --   Output 2100 ml   Net -2100 ml       Labs:  Hematology:  Recent Labs     20  0941 20  0436 20  0558   WBC 3.7 4.4 8.4   RBC 5.03 4.96 5.21   HGB 14.4 14.2 14.6   HCT 45.9 44.5 46.4   MCV 91.3 89.7 89.1   MCH 28.6 28.6 28.0   MCHC 31.4 31.9 31.5   RDW 13.5 13.7 13.0    164 186   MPV 11.0 11.6 11.7     Chemistry:  Recent Labs     20  0941 20  0558    141   K 3.4* 4.1    97*   CO2 26 30   GLUCOSE 161* 160*   BUN 11 9   CREATININE 0.91 0.77   MG 2.0  --    ANIONGAP 12 14   LABGLOM >60 >60   GFRAA >60 >60   CALCIUM 7.6* 8.4*     No results for input(s): PROT, LABALBU, LABA1C, R4APRML, Q0PGNYD, FT4, TSH, AST, ALT, LDH, GGT, ALKPHOS, LABGGT, BILITOT, BILIDIR, AMMONIA, AMYLASE, LIPASE, LACTATE, CHOL, HDL, LDLCHOLESTEROL, CHOLHDLRATIO, TRIG, VLDL, GCP26YA, PHENYTOIN, PHENYF, URICACID, POCGLU in the last 72 hours. ABG:No results found for: POCPH, PHART, PH, POCPCO2, OEH3WTV, PCO2, POCPO2, PO2ART, PO2, POCHCO3, XUU5VWO, HCO3, NBEA, PBEA, BEART, BE, THGBART, THB, MXA5LUL, QTKC9LTG, F0TBBGFM, O2SAT, FIO2  Lab Results   Component Value Date/Time    SPECIAL [facility] 2020 11:57 AM     Lab Results   Component Value Date/Time    CULTURE NO GROWTH 3 DAYS 2020 11:57 AM       Radiology:  [facility] Chest Standard (2 Vw)    Result Date: 2020  Cardiomegaly, and perihilar interstitial prominence with medial bibasilar opacities which may be related to pulmonary vascular congestion with interstitial edema versus pneumonitis. Ct Chest Pulmonary Embolism W Contrast    Result Date: 2020  1. Motion artifact degrades this evaluation. No evidence for central pulmonary embolism. 2.  Patchy opacities in the right lung base and dependent atelectasis. Underlying inflammatory process or infection cannot be excluded in the appropriate clinical setting.   No convincing evidence for edema on this exam.       Physical Examination:        General appearance:  alert, cooperative and no distress  Mental Status:  oriented to person, place and time and normal affect  Lungs: Decreased air entry bilateral short of breath improved  Heart:  regular rate and rhythm, no murmur  Abdomen:  soft, nontender, nondistended, normal bowel sounds, no masses, hepatomegaly, splenomegaly  Extremities:  no edema, redness, tenderness in the calves  Skin:  no gross lesions, rashes, induration    Assessment:        Hospital Problems           Last Modified POA    * (Principal) Pneumonia due to COVID-19 virus 7/6/2020 Yes    JOE (obstructive sleep apnea) (Chronic) 7/6/2020 Yes    Hypoxia 7/6/2020 Yes    Acute respiratory failure with hypoxia ([address] 7/6/2020 Yes    Morbid obesity with BMI of 45.0-49.9, adult ([address] 7/6/2020 Yes          Plan:          CO VID 19 pneumonia patient tolerated 2 units of plasma transfusion very well    Severe sepsis secondary to CO VID 19 pneumonia patient on oxygen  Patient had hypoxia fever tachypnea and tachycardia    Obstructive sleep apnea continue home medication    Acute hypoxemic respiratory failure on 5 L of oxygen    Jessa Blanco MD  7/9/2020  12:33 PM

## 2020-07-09 NOTE — PROGRESS NOTES
[facility] - Progress Note    Today's Date and Time: 7/9/2020, 12:09 PM    Impression :   · COVID-19 pneumonia right lower lobe  · Shortness of breath with underlying hypoxia  · Nausea  · Dizziness  · Morbid obesity  · History of obstructive sleep apnea    Recommendations:   · Monitor off antibiotics  · Patient consented to receive immune plasma for treatment of the COVID-19 infection. 2 units of plasma were administered on 7/7/2020 without any difficulties or adverse events    Medical Decision Making/Summary/Discussion:7/9/2020     ·   Infection Control Recommendations   · Universal Precautions  · Airborne isolation  · Droplet Isolation    Antimicrobial Stewardship Recommendations     · Discontinuation of therapy  Coordination of Outpatient Care:   · Estimated Length of IV antimicrobials: None  · Patient will need Midline Catheter Insertion: No  · Patient will need PICC line Insertion: No  · Patient will need: Home IV , [place],  SNF,  LTAC: To be determined  Patient will need outpatient wound care: No  Chief complaint/reason for consultation:   · COVID-19 pneumonia      History of Present Illness:   Wenceslao Curtis is a 38y.o.-year-old  male who was initially admitted on 7/6/2020. Patient seen at the request of Dr. Amalia Scott. INITIAL HISTORY:    Patient presented through ER at [facility] with complaints of progressive shortness of the breath, fatigue, dizziness and nausea. Dated the symptoms have been present for about 2 days. The patient indicated that he took a day off from work yesterday but despite the above he continued to worsen. 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 Denies any issues concerning smoking. Indicates that he drinks on a social basis. He has a past history of morbid obesity, essential hypertension, and obstructive sleep apnea.     Examination showed the patient to have a respiratory rate of 22 with a heart rate of 80 and temperature 98.8. He was hypoxic with an oxygen saturation of 86% on room air. The chest x-ray's showed a perihilar interstitial prominence with some bibasilar opacities which raise the question of venous congestion versus interstitial edema versus pneumonitis. His chest CT on 7/6/2020 showed some infiltrates at the right base. Because of the progressive nature of his symptoms COVID-19 was suspected. A rapid COVID-19 test was positive. Patient was directed for admission to the [facility]. CURRENT EVALUATION :7/9/2020     Patient evaluated and examined in the ICU. Afebrile  VS stable    Patient remains short of breath  Indicates he is fatigued, feels weak    Options for treatment were discussed with him by the clinical research unit. Patient gave consent to receive convalescent plasma. 2 units of plasma were ministered on 7/7/2020. The patient tolerated well and has experienced no adverse events. He was also approached about potential enrollment in the [facility] study protocol. Patient considered the protocol but ultimately declined it. Labs, X rays reviewed: 7/9/2020    BUN: 16-->11-->9  Cr: 1.50-->0.91-->0.77    WBC: 4.7-->3.7-->8.4  Hb: 14.6  Plat: 199-->173-->186    Cultures:  Urine:  ·   Blood:  · 7/6/2020:   Sputum :  ·   Wound:  ·     COVID-19 test:  · 7/6/2020-positive    Discussed with patient, RNSTEPHANIE. I have personally reviewed the past medical history, past surgical history, medications, social history, and family history, and I have updated the database accordingly.   Past Medical History:     Past Medical History:   Diagnosis Date    GERD (gastroesophageal reflux disease)     Headache(784.0)     Hyperlipidemia     Hypertension     Morbid obesity with BMI of 45.0-49.9, adult [address] [address]     Patient in clinical research study 07/07/2020    Convalescent plasma study Expected Areatha Madison of completion 8/7/20    Unspecified sleep apnea     Noncompliant with CPAP Grandmother         Allergies:   Pcn [penicillins]     Review of Systems:   Constitutional: No fevers or chills. Fatigue, malaise  Head: No headaches  Eyes: No double vision or blurry vision. No conjunctival inflammation. ENT: No sore throat or runny nose. . No hearing loss, tinnitus or vertigo. Cardiovascular: No chest pain or palpitations. Shortness of breath. GUTIERREZ  Lung: Shortness of breath, no cough. No sputum production  Abdomen: No nausea, vomiting, diarrhea, or abdominal pain. Ricka Distad No cramps. Genitourinary: No increased urinary frequency, or dysuria. No hematuria. No suprapubic or CVA pain  Musculoskeletal: No muscle aches or pains. No joint effusions, swelling or deformities  Hematologic: No bleeding or bruising. Neurologic: No headache, weakness, numbness, or tingling. Integument: No rash, no ulcers. Psychiatric: No depression. Endocrine: No polyuria, no polydipsia, no polyphagia. Physical Examination :     Patient Vitals for the past 8 hrs:   Temp Temp src Pulse Resp SpO2   07/09/20 0946 100 °F (37.8 °C) Oral 97 22 96 %   07/09/20 0545 99.6 °F (37.6 °C) Oral 102 26 93 %     General Appearance: Awake, alert, and in apparent distress. Obese  Head:  Normocephalic, no trauma  Eyes: Pupils equal, round, reactive to light and accommodation; extraocular movements intact; sclera anicteric; conjunctivae pink. No embolic phenomena. ENT: Oropharynx clear, without erythema, exudate, or thrush. No tenderness of sinuses. Mouth/throat: mucosa pink and moist. No lesions. Dentition in good repair. He is receiving oxygen by nasal cannula  Neck:Supple, without lymphadenopathy. Thyroid normal, No bruits. Pulmonary/Chest: Clear to auscultation, without wheezes, rales, or rhonchi. No dullness to percussion. Cardiovascular: Regular rate and rhythm without murmurs, rubs, or gallops. Abdomen: Soft, non tender. Bowel sounds normal. No organomegaly.   Protuberant  All four Extremities: No cyanosis, clubbing, edema, or effusions. Neurologic: No gross sensory or motor deficits. Skin: Warm and dry with good turgor. No signs of peripheral arterial or venous insufficiency. No ulcerations. No open wounds. Medical Decision Making -Laboratory:   I have independently reviewed/ordered the following labs:    CBC with Differential:   Recent Labs     07/08/20  0436 07/09/20  0558   WBC 4.4 8.4   HGB 14.2 14.6   HCT 44.5 46.4    186   LYMPHOPCT 14* 9*   MONOPCT 5 4     BMP:   Recent Labs     07/07/20  0941 07/09/20  0558    141   K 3.4* 4.1    97*   CO2 26 30   BUN 11 9   CREATININE 0.91 0.77   MG 2.0  --      Hepatic Function Panel:   No results for input(s): PROT, LABALBU, BILIDIR, IBILI, BILITOT, ALKPHOS, ALT, AST in the last 72 hours. No results for input(s): RPR in the last 72 hours. No results for input(s): HIV in the last 72 hours. No results for input(s): BC in the last 72 hours. Lab Results   Component Value Date    MUCUS NOT REPORTED 07/01/2019    RBC 5.21 07/09/2020    TRICHOMONAS NOT REPORTED 07/01/2019    WBC 8.4 07/09/2020    YEAST NOT REPORTED 07/01/2019    TURBIDITY CLEAR 07/01/2019     Lab Results   Component Value Date    CREATININE 0.77 07/09/2020    GLUCOSE 160 07/09/2020       Medical Decision Making-Imaging:     EXAMINATION:   CTA OF THE CHEST 7/6/2020 10:21 am       TECHNIQUE:   CTA of the chest was performed after the administration of intravenous   contrast.  Multiplanar reformatted images are provided for review.  MIP   images are provided for review. Dose modulation, iterative reconstruction,   and/or weight based adjustment of the mA/kV was utilized to reduce the   radiation dose to as low as reasonably achievable.       COMPARISON:   Chest radiograph today.       HISTORY:   ORDERING SYSTEM PROVIDED HISTORY: Chest Pain   TECHNOLOGIST PROVIDED HISTORY:   Chest Pain   Reason for Exam: Increasing SOB, low O2 sats.    Acuity: Acute   Type of Exam: Initial       FINDINGS:   Pulmonary Arteries: Appropriate contrast opacification of the pulmonary   arteries.  Motion artifact degrades evaluation however.  No evidence for   central pulmonary embolism.  Limited evaluation of the segmental branches.       Mediastinum: No evidence of mediastinal lymphadenopathy.  The heart and   pericardium demonstrate no acute abnormality.  There is no acute abnormality   of the thoracic aorta.       Lungs/pleura: Respiratory motion artifact and expiratory phase of imaging   noted.  Patchy opacities in the right lung base and dependent atelectasis are   noted.  No convincing evidence for edema.  No significant effusion.  The   central airway is patent.       Upper Abdomen: Findings suggestive of hepatic steatosis.       Soft Tissues/Bones: No acute bone or soft tissue abnormality.           Impression   1.  Motion artifact degrades this evaluation.  No evidence for central   pulmonary embolism.       2.  Patchy opacities in the right lung base and dependent atelectasis. Underlying inflammatory process or infection cannot be excluded in the   appropriate clinical setting.  No convincing evidence for edema on this exam.                 Medical Decision Spveds-[id number]:       Medical Decision Making-Other:     Note:  · Labs, medications, radiologic studies were reviewed with personal review of films  · Large amounts of data were reviewed  · Discussed with nursing Staff, Discharge planner  · Infection Control and Prevention measures reviewed  · All prior entries were reviewed  · Administer medications as ordered  · Prognosis: Guarded  · Discharge planning reviewed  · Follow up as outpatient. Thank you for allowing us to participate in the care of this patient. Please call with questions.     Nickie Leone MD  Pager: [phone] - Office: [phone]

## 2020-07-10 LAB
ABSOLUTE EOS #: 0 K/UL (ref 0–0.4)
ABSOLUTE IMMATURE GRANULOCYTE: 0 K/UL (ref 0–0.3)
ABSOLUTE LYMPH #: 0.4 K/UL (ref 1–4.8)
ABSOLUTE MONO #: 0.2 K/UL (ref 0.1–0.8)
BASOPHILS # BLD: 0 % (ref 0–2)
BASOPHILS ABSOLUTE: 0 K/UL (ref 0–0.2)
DIFFERENTIAL TYPE: ABNORMAL
EOSINOPHILS RELATIVE PERCENT: 0 % (ref 1–4)
HCT VFR BLD CALC: 44.6 % (ref 40.7–50.3)
HEMOGLOBIN: 14.3 G/DL (ref 13–17)
IMMATURE GRANULOCYTES: 0 %
LYMPHOCYTES # BLD: 6 % (ref 24–44)
MCH RBC QN AUTO: 28.4 PG (ref 25.2–33.5)
MCHC RBC AUTO-ENTMCNC: 32.1 G/DL (ref 28.4–34.8)
MCV RBC AUTO: 88.7 FL (ref 82.6–102.9)
MONOCYTES # BLD: 3 % (ref 1–7)
MORPHOLOGY: NORMAL
NRBC AUTOMATED: 0 PER 100 WBC
PDW BLD-RTO: 13.2 % (ref 11.8–14.4)
PLATELET # BLD: 192 K/UL (ref 138–453)
PLATELET ESTIMATE: ABNORMAL
PMV BLD AUTO: 11.8 FL (ref 8.1–13.5)
RBC # BLD: 5.03 M/UL (ref 4.21–5.77)
RBC # BLD: ABNORMAL 10*6/UL
SEG NEUTROPHILS: 91 % (ref 36–66)
SEGMENTED NEUTROPHILS ABSOLUTE COUNT: 6 K/UL (ref 1.8–7.7)
WBC # BLD: 6.6 K/UL (ref 3.5–11.3)
WBC # BLD: ABNORMAL 10*3/UL

## 2020-07-10 PROCEDURE — 6360000002 HC RX W HCPCS: Performed by: NURSE PRACTITIONER

## 2020-07-10 PROCEDURE — 6360000002 HC RX W HCPCS: Performed by: INTERNAL MEDICINE

## 2020-07-10 PROCEDURE — 85025 COMPLETE CBC W/AUTO DIFF WBC: CPT

## 2020-07-10 PROCEDURE — 2580000003 HC RX 258: Performed by: NURSE PRACTITIONER

## 2020-07-10 PROCEDURE — 6370000000 HC RX 637 (ALT 250 FOR IP): Performed by: NURSE PRACTITIONER

## 2020-07-10 PROCEDURE — 99232 SBSQ HOSP IP/OBS MODERATE 35: CPT | Performed by: INTERNAL MEDICINE

## 2020-07-10 PROCEDURE — 6370000000 HC RX 637 (ALT 250 FOR IP): Performed by: INTERNAL MEDICINE

## 2020-07-10 PROCEDURE — 99233 SBSQ HOSP IP/OBS HIGH 50: CPT | Performed by: INTERNAL MEDICINE

## 2020-07-10 PROCEDURE — 1200000000 HC SEMI PRIVATE

## 2020-07-10 RX ADMIN — AMLODIPINE BESYLATE 10 MG: 10 TABLET ORAL at 08:51

## 2020-07-10 RX ADMIN — METHYLPREDNISOLONE SODIUM SUCCINATE 40 MG: 40 INJECTION, POWDER, FOR SOLUTION INTRAMUSCULAR; INTRAVENOUS at 21:52

## 2020-07-10 RX ADMIN — ENOXAPARIN SODIUM 40 MG: 40 INJECTION SUBCUTANEOUS at 21:53

## 2020-07-10 RX ADMIN — ENOXAPARIN SODIUM 40 MG: 40 INJECTION SUBCUTANEOUS at 08:51

## 2020-07-10 RX ADMIN — ACETAMINOPHEN 650 MG: 325 TABLET ORAL at 21:53

## 2020-07-10 RX ADMIN — SODIUM CHLORIDE, PRESERVATIVE FREE 10 ML: 5 INJECTION INTRAVENOUS at 08:51

## 2020-07-10 RX ADMIN — FUROSEMIDE 20 MG: 20 TABLET ORAL at 17:42

## 2020-07-10 RX ADMIN — SODIUM CHLORIDE, PRESERVATIVE FREE 10 ML: 5 INJECTION INTRAVENOUS at 21:55

## 2020-07-10 RX ADMIN — FUROSEMIDE 20 MG: 20 TABLET ORAL at 08:51

## 2020-07-10 RX ADMIN — METHYLPREDNISOLONE SODIUM SUCCINATE 40 MG: 40 INJECTION, POWDER, FOR SOLUTION INTRAMUSCULAR; INTRAVENOUS at 08:51

## 2020-07-10 ASSESSMENT — PAIN DESCRIPTION - PAIN TYPE: TYPE: ACUTE PAIN

## 2020-07-10 ASSESSMENT — PAIN SCALES - GENERAL: PAINLEVEL_OUTOF10: 8

## 2020-07-10 ASSESSMENT — PAIN DESCRIPTION - LOCATION: LOCATION: HEAD

## 2020-07-10 NOTE — CARE COORDINATION
TRANSITIONAL CARE PLANNING/ 2 [facility]: 4    Reason for Admission: Hypoxia [R09.02]     Treatment Plan of Care: IM, ID following,received 2 U plasma    Tests/Procedures still needed:O2 weaning.  Currently on O2 @3L/NC      Barriers to Discharge: iv solumedrol q 12    Readmission Risk              Risk of Unplanned Readmission:        8            Patient goals/Treatment Preferences/Transitional Plan:Home with family support      Referrals Made: none    Follow Up needed: Home with family support, denies HC needs, watch for possible home O2 eval

## 2020-07-10 NOTE — PROGRESS NOTES
[facility] - Progress Note    Today's Date and Time: 7/10/2020, 10:16 AM    Impression :   · COVID-19 pneumonia right lower lobe  · Shortness of breath with underlying hypoxia  · Nausea  · Dizziness  · Morbid obesity  · History of obstructive sleep apnea    Recommendations:   · Monitor off antibiotics  · Patient consented to receive immune plasma for treatment of the COVID-19 infection. 2 units of plasma were administered on 7/7/2020 without any difficulties or adverse events    Medical Decision Making/Summary/Discussion:7/10/2020     ·   Infection Control Recommendations   · Universal Precautions  · Airborne isolation  · Droplet Isolation    Antimicrobial Stewardship Recommendations     · Discontinuation of therapy  Coordination of Outpatient Care:   · Estimated Length of IV antimicrobials: None  · Patient will need Midline Catheter Insertion: No  · Patient will need PICC line Insertion: No  · Patient will need: Home IV , [place],  SNF,  LTAC: To be determined  Patient will need outpatient wound care: No  Chief complaint/reason for consultation:   · COVID-19 pneumonia      History of Present Illness:   Tyler Leyva is a 38y.o.-year-old  male who was initially admitted on 7/6/2020. Patient seen at the request of Dr. Alline Collet. INITIAL HISTORY:    Patient presented through ER at [facility] with complaints of progressive shortness of the breath, fatigue, dizziness and nausea. Dated the symptoms have been present for about 2 days. The patient indicated that he took a day off from work yesterday but despite the above he continued to worsen. 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 Denies any issues concerning smoking. Indicates that he drinks on a social basis. He has a past history of morbid obesity, essential hypertension, and obstructive sleep apnea.     Examination showed the patient to have a respiratory rate of 22 with a heart rate of 80 and temperature 98.8. He was hypoxic with an oxygen saturation of 86% on room air. The chest x-ray's showed a perihilar interstitial prominence with some bibasilar opacities which raise the question of venous congestion versus interstitial edema versus pneumonitis. His chest CT on 7/6/2020 showed some infiltrates at the right base. Because of the progressive nature of his symptoms COVID-19 was suspected. A rapid COVID-19 test was positive. Patient was directed for admission to the [facility] [place] [address]. CURRENT EVALUATION :7/10/2020     Patient evaluated and examined in the ICU. Afebrile  VS stable, HTN    Patient feels better. Remains comfortable on 3 L of nasal oxygen  No new issues described    Options for treatment were discussed with him by the clinical research unit. Patient gave consent to receive convalescent plasma. 2 units of plasma were ministered on 7/7/2020. The patient tolerated well and has experienced no adverse events. He was also approached about potential enrollment in the [facility] study protocol. Patient considered the protocol but ultimately declined it. Labs, X rays reviewed: 7/10/2020    BUN: 16-->11-->9  Cr: 1.50-->0.91-->0.77    WBC: 4.7-->3.7-->8.4-->6.6  Hb: 14.6-->14.3  Plat: 199-->173-->186-->192    Cultures:  Urine:  ·   Blood:  · 7/6/2020: No growth  Sputum :  ·   Wound:  ·     COVID-19 test:  · 7/6/2020-positive    Discussed with patient, RN, IM. I have personally reviewed the past medical history, past surgical history, medications, social history, and family history, and I have updated the database accordingly.   Past Medical History:     Past Medical History:   Diagnosis Date    GERD (gastroesophageal reflux disease)     Headache(784.0)     Hyperlipidemia     Hypertension     Morbid obesity with BMI of 45.0-49.9, adult ([facility] [address]     Patient in clinical research study 07/07/2020    Convalescent plasma study Expected Harvinder De La Fuente of completion 8/7/20  Unspecified sleep apnea     Noncompliant with CPAP       Past Surgical  History:     Past Surgical History:   Procedure Laterality Date    DENTAL SURGERY         Medications:      furosemide  20 mg Oral BID    methylPREDNISolone  40 mg Intravenous Q12H    sodium chloride  20 mL Intravenous Once    enoxaparin  40 mg Subcutaneous BID    amLODIPine  10 mg Oral Daily    sodium chloride flush  10 mL Intravenous 2 times per day       Social History:     Social History     Socioeconomic History    Marital status: Single     Spouse name: Not on file    Number of children: Not on file    Years of education: Not on file    Highest education level: Not on file   Occupational History    Not on file   Social Needs    Financial resource strain: Not on file    Food insecurity     Worry: Not on file     Inability: Not on file    Transportation needs     Medical: Not on file     Non-medical: Not on file   Tobacco Use    Smoking status: Never Smoker    Smokeless tobacco: Never Used   Substance and Sexual Activity    Alcohol use:  Yes     Alcohol/week: 0.0 standard drinks     Comment: socially    Drug use: No    Sexual activity: Not on file   Lifestyle    Physical activity     Days per week: Not on file     Minutes per session: Not on file    Stress: Not on file   Relationships    Social connections     Talks on phone: Not on file     Gets together: Not on file     Attends Scientologist service: Not on file     Active member of club or organization: Not on file     Attends meetings of clubs or organizations: Not on file     Relationship status: Not on file    Intimate partner violence     Fear of current or ex partner: Not on file     Emotionally abused: Not on file     Physically abused: Not on file     Forced sexual activity: Not on file   Other Topics Concern    Not on file   Social History Narrative    Not on file       Family History:     Family History   Problem Relation Age of Onset    Diabetes Mother arteries.  Motion artifact degrades evaluation however.  No evidence for   central pulmonary embolism.  Limited evaluation of the segmental branches.       Mediastinum: No evidence of mediastinal lymphadenopathy.  The heart and   pericardium demonstrate no acute abnormality.  There is no acute abnormality   of the thoracic aorta.       Lungs/pleura: Respiratory motion artifact and expiratory phase of imaging   noted.  Patchy opacities in the right lung base and dependent atelectasis are   noted.  No convincing evidence for edema.  No significant effusion.  The   central airway is patent.       Upper Abdomen: Findings suggestive of hepatic steatosis.       Soft Tissues/Bones: No acute bone or soft tissue abnormality.           Impression   1.  Motion artifact degrades this evaluation.  No evidence for central   pulmonary embolism.       2.  Patchy opacities in the right lung base and dependent atelectasis. Underlying inflammatory process or infection cannot be excluded in the   appropriate clinical setting.  No convincing evidence for edema on this exam.                 Medical Decision Vatxbh-[id number]:       Medical Decision Making-Other:     Note:  · Labs, medications, radiologic studies were reviewed with personal review of films  · Large amounts of data were reviewed  · Discussed with nursing Staff, Discharge planner  · Infection Control and Prevention measures reviewed  · All prior entries were reviewed  · Administer medications as ordered  · Prognosis: Guarded  · Discharge planning reviewed  · Follow up as outpatient. Thank you for allowing us to participate in the care of this patient. Please call with questions.     Elisabeth Millard MD  Pager: [phone] - Office: [phone]

## 2020-07-10 NOTE — PROGRESS NOTES
[facility]    Progress Note    7/10/2020    11:28 AM    Name:   Mary Alice Carias  MRN:     [id number]     Acct:      [de-identified]   Room:   [address] [place] [address] Day:  4  Admit Date:  7/6/2020  3:34 PM    PCP:   Lindsey Litten, MD  Code Status:  Full Code    Subjective:     C/C: No chief complaint on file. Interval History Status: not changed. Patient seen and examined    Patient feels weak short of breath received plasma tolerated well  Oxygen continue to be weaned down  Patient feels better  Patient denies fever chest pain    I am seeing the patient for shortness of breath      Medications: Allergies: Allergies   Allergen Reactions    Pcn [Penicillins]        Current Meds:   Scheduled Meds:    furosemide  20 mg Oral BID    methylPREDNISolone  40 mg Intravenous Q12H    sodium chloride  20 mL Intravenous Once    enoxaparin  40 mg Subcutaneous BID    amLODIPine  10 mg Oral Daily    sodium chloride flush  10 mL Intravenous 2 times per day     Continuous Infusions:     PRN Meds: sodium chloride flush, potassium chloride **OR** potassium alternative oral replacement **OR** potassium chloride, magnesium sulfate, acetaminophen **OR** acetaminophen, promethazine **OR** ondansetron, bisacodyl    Data:     Past Medical History:   has a past medical history of GERD (gastroesophageal reflux disease), Headache(784.0), Hyperlipidemia, Hypertension, Morbid obesity with BMI of 45.0-49.9, adult [facility]), Patient in clinical research study, and Unspecified sleep apnea. Social History:   reports that he has never smoked. He has never used smokeless tobacco. He reports current alcohol use. He reports that he does not use drugs.      Family History:   Family History   Problem Relation Age of Onset    Diabetes Mother     High Blood Pressure Father     Cancer Maternal Grandmother        Vitals:  BP (!) 160/108   Pulse 92   Temp 98.3 °F (36.8 °C) (Oral)   Resp 22   Ht 6' (1.829 m)   Wt (!) 366 lb 10 oz (166.3 kg)   SpO2 95%   BMI 49.72 kg/m²   Temp (24hrs), Av °F (37.2 °C), Min:98.3 °F (36.8 °C), Max:99.7 °F (37.6 °C)    No results for input(s): POCGLU in the last 72 hours. I/O (24Hr): Intake/Output Summary (Last 24 hours) at 7/10/2020 1128  Last data filed at 2020 1848  Gross per 24 hour   Intake 840 ml   Output --   Net 840 ml       Labs:  Hematology:  Recent Labs     20  0436 20  0558 07/10/20  0237   WBC 4.4 8.4 6.6   RBC 4.96 5.21 5.03   HGB 14.2 14.6 14.3   HCT 44.5 46.4 44.6   MCV 89.7 89.1 88.7   MCH 28.6 28.0 28.4   MCHC 31.9 31.5 32.1   RDW 13.7 13.0 13.2    186 192   MPV 11.6 11.7 11.8     Chemistry:  Recent Labs     20  0558      K 4.1   CL 97*   CO2 30   GLUCOSE 160*   BUN 9   CREATININE 0.77   ANIONGAP 14   LABGLOM >60   GFRAA >60   CALCIUM 8.4*     No results for input(s): PROT, LABALBU, LABA1C, M8OBIFO, N4ADWME, FT4, TSH, AST, ALT, LDH, GGT, ALKPHOS, LABGGT, BILITOT, BILIDIR, AMMONIA, AMYLASE, LIPASE, LACTATE, CHOL, HDL, LDLCHOLESTEROL, CHOLHDLRATIO, TRIG, VLDL, MWH87TY, PHENYTOIN, PHENYF, URICACID, POCGLU in the last 72 hours. ABG:No results found for: POCPH, PHART, PH, POCPCO2, PYB8VKP, PCO2, POCPO2, PO2ART, PO2, POCHCO3, GOB2TFV, HCO3, NBEA, PBEA, BEART, BE, THGBART, THB, ZLL7XXC, WICU6XAQ, K7BWJZKC, O2SAT, FIO2  Lab Results   Component Value Date/Time    SPECIAL LEFT [facility] 2020 11:57 AM     Lab Results   Component Value Date/Time    CULTURE NO GROWTH 4 DAYS 2020 11:57 AM       Radiology:  [facility] Chest Standard (2 Vw)    Result Date: 2020  Cardiomegaly, and perihilar interstitial prominence with medial bibasilar opacities which may be related to pulmonary vascular congestion with interstitial edema versus pneumonitis. Ct Chest Pulmonary Embolism W Contrast    Result Date: 2020  1. Motion artifact degrades this evaluation.   No evidence for central pulmonary

## 2020-07-11 VITALS
OXYGEN SATURATION: 98 % | RESPIRATION RATE: 19 BRPM | DIASTOLIC BLOOD PRESSURE: 94 MMHG | BODY MASS INDEX: 42.66 KG/M2 | SYSTOLIC BLOOD PRESSURE: 151 MMHG | HEIGHT: 72 IN | TEMPERATURE: 96.8 F | HEART RATE: 81 BPM | WEIGHT: 315 LBS

## 2020-07-11 LAB
ABSOLUTE EOS #: <0.03 K/UL (ref 0–0.44)
ABSOLUTE IMMATURE GRANULOCYTE: 0.04 K/UL (ref 0–0.3)
ABSOLUTE LYMPH #: 0.83 K/UL (ref 1.1–3.7)
ABSOLUTE MONO #: 0.32 K/UL (ref 0.1–1.2)
BASOPHILS # BLD: 0 % (ref 0–2)
BASOPHILS ABSOLUTE: <0.03 K/UL (ref 0–0.2)
DIFFERENTIAL TYPE: ABNORMAL
EOSINOPHILS RELATIVE PERCENT: 0 % (ref 1–4)
HCT VFR BLD CALC: 47.3 % (ref 40.7–50.3)
HEMOGLOBIN: 15.1 G/DL (ref 13–17)
IMMATURE GRANULOCYTES: 1 %
LYMPHOCYTES # BLD: 11 % (ref 24–43)
MCH RBC QN AUTO: 28.3 PG (ref 25.2–33.5)
MCHC RBC AUTO-ENTMCNC: 31.9 G/DL (ref 28.4–34.8)
MCV RBC AUTO: 88.7 FL (ref 82.6–102.9)
MONOCYTES # BLD: 4 % (ref 3–12)
NRBC AUTOMATED: 0 PER 100 WBC
PDW BLD-RTO: 13 % (ref 11.8–14.4)
PLATELET # BLD: 208 K/UL (ref 138–453)
PLATELET ESTIMATE: ABNORMAL
PMV BLD AUTO: 11.3 FL (ref 8.1–13.5)
RBC # BLD: 5.33 M/UL (ref 4.21–5.77)
RBC # BLD: ABNORMAL 10*6/UL
SEG NEUTROPHILS: 84 % (ref 36–65)
SEGMENTED NEUTROPHILS ABSOLUTE COUNT: 6.14 K/UL (ref 1.5–8.1)
WBC # BLD: 7.3 K/UL (ref 3.5–11.3)
WBC # BLD: ABNORMAL 10*3/UL

## 2020-07-11 PROCEDURE — 6370000000 HC RX 637 (ALT 250 FOR IP): Performed by: INTERNAL MEDICINE

## 2020-07-11 PROCEDURE — 6360000002 HC RX W HCPCS: Performed by: NURSE PRACTITIONER

## 2020-07-11 PROCEDURE — 6370000000 HC RX 637 (ALT 250 FOR IP): Performed by: NURSE PRACTITIONER

## 2020-07-11 PROCEDURE — 2580000003 HC RX 258: Performed by: NURSE PRACTITIONER

## 2020-07-11 PROCEDURE — 99239 HOSP IP/OBS DSCHRG MGMT >30: CPT | Performed by: INTERNAL MEDICINE

## 2020-07-11 PROCEDURE — 85025 COMPLETE CBC W/AUTO DIFF WBC: CPT

## 2020-07-11 PROCEDURE — 2700000000 HC OXYGEN THERAPY PER DAY

## 2020-07-11 PROCEDURE — 6360000002 HC RX W HCPCS: Performed by: INTERNAL MEDICINE

## 2020-07-11 PROCEDURE — 94761 N-INVAS EAR/PLS OXIMETRY MLT: CPT

## 2020-07-11 RX ORDER — ALBUTEROL SULFATE 90 UG/1
2 AEROSOL, METERED RESPIRATORY (INHALATION) 4 TIMES DAILY PRN
Qty: 3 INHALER | Refills: 0 | Status: SHIPPED | OUTPATIENT
Start: 2020-07-11

## 2020-07-11 RX ADMIN — METHYLPREDNISOLONE SODIUM SUCCINATE 40 MG: 40 INJECTION, POWDER, FOR SOLUTION INTRAMUSCULAR; INTRAVENOUS at 08:16

## 2020-07-11 RX ADMIN — FUROSEMIDE 20 MG: 20 TABLET ORAL at 08:16

## 2020-07-11 RX ADMIN — SODIUM CHLORIDE, PRESERVATIVE FREE 10 ML: 5 INJECTION INTRAVENOUS at 08:17

## 2020-07-11 RX ADMIN — AMLODIPINE BESYLATE 10 MG: 10 TABLET ORAL at 08:16

## 2020-07-11 RX ADMIN — ENOXAPARIN SODIUM 40 MG: 40 INJECTION SUBCUTANEOUS at 08:16

## 2020-07-11 ASSESSMENT — PAIN SCALES - GENERAL: PAINLEVEL_OUTOF10: 0

## 2020-07-11 NOTE — DISCHARGE SUMMARY
[facility]    Discharge Summary     Patient ID: Figueroa Kim  :  1980   MRN: [id number]     ACCOUNT:  [de-identified]   Patient's PCP: Orvan Burkitt, MD  Admit Date: 2020   Discharge Date: 2020     Length of Stay: 5  Code Status:  Full Code  Admitting Physician: Artem Tucker MD  Discharge Physician: Artem Tucker MD     Active Discharge Diagnoses:     Hospital Problem Lists:  Principal Problem:    Pneumonia due to COVID-19 virus  Active Problems:    JOE (obstructive sleep apnea)    Hypoxia    Acute respiratory failure with hypoxia ([address]    Morbid obesity with BMI of 45.0-49.9, adult ([address]  Resolved Problems:    * No resolved hospital problems. *      Admission Condition:  poor     Discharged Condition: good    Hospital Stay:     Hospital Course:  Figueroa Kim is a 38 y.o. male who was admitted for the management of   Pneumonia due to COVID-19 virus , presented to ER with No chief complaint on file.     CO VID 19 pneumonia patient tolerated 2 units of plasma transfusion very well     Severe sepsis secondary to CO VID 19 pneumonia patient on oxygen  Resolved     Obstructive sleep apnea continue home medication     Acute hypoxemic respiratory failure secondary to CO VID 19 pneumonia wean off oxygen follow-up with PCP     Physical exam     Alert  Chest: Clear to auscultation bilateral  Abdomen: Nontender nondistended  CVS: S1-S2  Neurology: Moves extremity        Significant therapeutic interventions:     Significant Diagnostic Studies:   Labs / Micro:  CBC:   Lab Results   Component Value Date    WBC 7.3 2020    RBC 5.33 2020    HGB 15.1 2020    HCT 47.3 2020    MCV 88.7 2020    MCH 28.3 2020    MCHC 31.9 2020    RDW 13.0 2020     2020     BMP:    Lab Results   Component Value Date    GLUCOSE 160 2020     2020    K 4.1 2020 CL 97 07/09/2020    CO2 30 07/09/2020    ANIONGAP 14 07/09/2020    BUN 9 07/09/2020    CREATININE 0.77 07/09/2020    BUNCRER NOT REPORTED 07/09/2020    CALCIUM 8.4 07/09/2020    LABGLOM >60 07/09/2020    GFRAA >60 07/09/2020    GFR      07/09/2020    GFR NOT REPORTED 07/09/2020     HFP:    Lab Results   Component Value Date    PROT 6.7 07/06/2020     CMP:    Lab Results   Component Value Date    GLUCOSE 160 07/09/2020     07/09/2020    K 4.1 07/09/2020    CL 97 07/09/2020    CO2 30 07/09/2020    BUN 9 07/09/2020    CREATININE 0.77 07/09/2020    ANIONGAP 14 07/09/2020    ALKPHOS 56 07/06/2020    ALT 24 07/06/2020    AST 27 07/06/2020    BILITOT 0.50 07/06/2020    LABALBU 3.5 07/06/2020    ALBUMIN 1.1 07/06/2020    LABGLOM >60 07/09/2020    GFRAA >60 07/09/2020    GFR      07/09/2020    GFR NOT REPORTED 07/09/2020    PROT 6.7 07/06/2020    CALCIUM 8.4 07/09/2020     PT/INR:    Lab Results   Component Value Date    PROTIME 11.5 10/16/2013    INR 1.1 10/16/2013     PTT:   Lab Results   Component Value Date    APTT 27.7 10/16/2013     FLP:    Lab Results   Component Value Date    CHOL 166 10/17/2013    TRIG 152 10/17/2013    HDL 40 10/17/2013     U/A:    Lab Results   Component Value Date    COLORU YELLOW 07/01/2019    TURBIDITY CLEAR 07/01/2019    SPECGRAV 1.020 07/01/2019    HGBUR NEGATIVE 07/01/2019    PHUR 6.0 07/01/2019    PROTEINU TRACE 07/01/2019    GLUCOSEU NEGATIVE 07/01/2019    KETUA NEGATIVE 07/01/2019    BILIRUBINUR NEGATIVE 07/01/2019    UROBILINOGEN Normal 07/01/2019    NITRU NEGATIVE 07/01/2019    LEUKOCYTESUR TRACE 07/01/2019     TSH:    Lab Results   Component Value Date    TSH 1.41 10/08/2013        Radiology:  Xr Chest Standard (2 Vw)    Result Date: 7/6/2020  Cardiomegaly, and perihilar interstitial prominence with medial bibasilar opacities which may be related to pulmonary vascular congestion with interstitial edema versus pneumonitis.      Ct Chest Pulmonary Embolism W Contrast    Result Date: 7/6/2020  1. Motion artifact degrades this evaluation. No evidence for central pulmonary embolism. 2.  Patchy opacities in the right lung base and dependent atelectasis. Underlying inflammatory process or infection cannot be excluded in the appropriate clinical setting. No convincing evidence for edema on this exam.       Consultations:    Consults:     Final Specialist Recommendations/Findings:   IP CONSULT TO INFECTIOUS DISEASES      The patient was seen and examined on day of discharge and this discharge summary is in conjunction with any daily progress note from day of discharge. Discharge plan:     Disposition: Home    Physician Follow Up:     No follow-up provider specified. Requiring Further Evaluation/Follow Up POST HOSPITALIZATION/Incidental Findings:     Diet: cardiac diet    Activity: As tolerated    Instructions to Patient:     Discharge Medications:      Medication List      START taking these medications    albuterol sulfate  (90 Base) MCG/ACT inhaler  Commonly known as:  Ventolin HFA  Inhale 2 puffs into the lungs 4 times daily as needed for Wheezing        CONTINUE taking these medications    amLODIPine 10 MG tablet  Commonly known as:  NORVASC     fluticasone 50 MCG/ACT nasal spray  Commonly known as:  Flonase  1 spray by Each Nare route daily     lisinopril-hydroCHLOROthiazide 20-25 MG per tablet  Commonly known as:  PRINZIDE;ZESTORETIC  Take 1 tablet by mouth daily        STOP taking these medications    ibuprofen 800 MG tablet  Commonly known as:  ADVIL;MOTRIN           Where to Get Your Medications      You can get these medications from any pharmacy    Bring a paper prescription for each of these medications  · albuterol sulfate  (90 Base) MCG/ACT inhaler         No discharge procedures on file.     Time Spent on discharge is  35 mins in patient examination, evaluation, counseling as well as medication reconciliation, prescriptions for required medications, discharge plan and follow up. Electronically signed by   Desirae Truong MD  7/11/2020  11:11 AM      Thank you Dr. Levar Lei MD for the opportunity to be involved in this patient's care.

## 2020-07-11 NOTE — PROGRESS NOTES
Pt given discharge instructions. Pt stated that he understood all instructions. Pt was taken off unit by staff with all belongings in hand. Pt was discharged home. Pt was taken home by family member in their personal vehicle.     Electronically signed by Kelsey Montesinos RN on 7/11/2020 at 1:14 PM

## 2020-07-11 NOTE — PLAN OF CARE
Problem: Airway Clearance - Ineffective  Goal: Achieve or maintain patent airway  Outcome: Ongoing     Problem: Gas Exchange - Impaired  Goal: Absence of hypoxia  Outcome: Ongoing  Goal: Promote optimal lung function  Outcome: Ongoing     Problem: Breathing Pattern - Ineffective  Goal: Ability to achieve and maintain a regular respiratory rate  Outcome: Ongoing     Problem:  Body Temperature -  Risk of, Imbalanced  Goal: Ability to maintain a body temperature within defined limits  Outcome: Ongoing  Goal: Will regain or maintain usual level of consciousness  Outcome: Ongoing  Goal: Complications related to the disease process, condition or treatment will be avoided or minimized  Outcome: Ongoing     Problem: Isolation Precautions - Risk of Spread of Infection  Goal: Prevent transmission of infection  Outcome: Ongoing     Problem: Nutrition Deficits  Goal: Optimize nutrtional status  Outcome: Ongoing     Problem: Risk for Fluid Volume Deficit  Goal: Maintain normal heart rhythm  Outcome: Ongoing  Goal: Maintain absence of muscle cramping  Outcome: Ongoing  Goal: Maintain normal serum potassium, sodium, calcium, phosphorus, and pH  Outcome: Ongoing     Problem: Loneliness or Risk for Loneliness  Goal: Demonstrate positive use of time alone when socialization is not possible  Outcome: Ongoing     Problem: Fatigue  Goal: Verbalize increase energy and improved vitality  Outcome: Ongoing     Problem: Patient Education: Go to Patient Education Activity  Goal: Patient/Family Education  Outcome: Ongoing     Problem: Falls - Risk of:  Goal: Will remain free from falls  Description: Will remain free from falls  Outcome: Ongoing  Goal: Absence of physical injury  Description: Absence of physical injury  Outcome: Ongoing     Problem: Pain:  Goal: Pain level will decrease  Description: Pain level will decrease  Outcome: Ongoing  Goal: Control of acute pain  Description: Control of acute pain  Outcome: Ongoing  Goal: Control of chronic pain  Description: Control of chronic pain  Outcome: Ongoing

## 2020-07-11 NOTE — PROGRESS NOTES
Spoke with patient regarding calling to update family. Pt stated that he has been updating family and declined to have writer call family.

## 2020-07-11 NOTE — PROGRESS NOTES
Home Oxygen Evaluation    Home Oxygen Evaluation completed. Patient is on 0 liters per minute via na. Resting SpO2 = 94%  Resting SpO2 on room air = 94%    SpO2 on room air with exercise = 90%  SpO2 on oxygen as above with exercise = na%    Nocturnal Oximetry with patient on room air is recommended is SpO2 is between 89% and 95% (requires additional order).     Crystal Barragan  11:20 AM

## 2020-07-11 NOTE — PLAN OF CARE
Problem: Airway Clearance - Ineffective  Goal: Achieve or maintain patent airway  7/11/2020 0904 by Annette Cr RCP  Outcome: Ongoing  7/11/2020 0705 by Maria Alejandra Pineda RN  Outcome: Ongoing     Problem: Gas Exchange - Impaired  Goal: Absence of hypoxia  7/11/2020 0904 by Annette Cr RCP  Outcome: Ongoing  7/11/2020 0705 by Maria Alejandra Pineda RN  Outcome: Ongoing  Goal: Promote optimal lung function  7/11/2020 0904 by Annette Cr RCP  Outcome: Ongoing  7/11/2020 0705 by Maria Alejandra Pineda RN  Outcome: Ongoing     Problem: Breathing Pattern - Ineffective  Goal: Ability to achieve and maintain a regular respiratory rate  7/11/2020 0904 by Annette Cr RCP  Outcome: Ongoing  7/11/2020 0705 by Maria Alejandra Pineda RN  Outcome: Ongoing

## 2020-07-12 LAB
CULTURE: NORMAL
CULTURE: NORMAL
Lab: NORMAL
Lab: NORMAL
SPECIMEN DESCRIPTION: NORMAL
SPECIMEN DESCRIPTION: NORMAL

## 2020-07-13 NOTE — ADT AUTH CERT
Pneumonia - Care Day 4 (7/9/2020) by Ludwig Izquierdo RN         Review Status  Review Entered    Completed  7/13/2020 09:51        Criteria Review       Care Day: 4 Care Date: 7/9/2020 Level of Care:    Guideline Day 2    Clinical Status    (X) * No CO2 retention or acidosis    (X) * No requirement for mechanical ventilation    (X) * Hypotension absent    ( ) * Fever absent or reduced    ( ) * No hypoxia on room air or oxygenation improved    ( ) * Mental status improved or at baseline    Activity    ( ) * Increased activity    Routes    (X) Oral hydration, medications    7/13/2020 9:51 AM EDT by [place]      norvasc 10 mg po qd  lovenox 40 mg po bid  lasix 20 mg po bid  solu medrol 40 mg iv q 12 hr  tylenol 650 mg po prn x 1    (X) Usual diet    Interventions    (X) Pulse oximetry    7/13/2020 9:51 AM EDT by [place]      91-97% NC 3 L    (X) Head of bed at 30 degrees    (X) Possible oxygen    7/13/2020 9:51 AM EDT by [place]      3 L NC    * Milestone    Additional Notes

## 2020-07-14 NOTE — ADT AUTH CERT
Pneumonia - Care Day 5 (7/10/2020) by Maria Victoria Leyva RN         Review Status  Review Entered    Completed  2020 10:55        Criteria Review       Care Day: 5 Care Date: 7/10/2020 Level of Care:    Guideline Day 2    Clinical Status    (X) * No CO2 retention or acidosis    (X) * No requirement for mechanical ventilation    (X) * Hypotension absent    2020 10:55 AM EDT by Jose Lawson      153 / 110    (X) * Fever absent or reduced    2020 10:55 AM EDT by Vane Salgado      98.4    ( ) * No hypoxia on room air or oxygenation improved    ( ) * Mental status improved or at baseline    Activity    ( ) * Increased activity    Routes    (X) Oral hydration, medications    2020 10:55 AM EDT by Jose Lawson      see attached for medications    (X) Usual diet    Interventions    (X) Pulse oximetry    2020 10:55 AM EDT by Jose Lawson      95-98%    (X) Head of bed at 30 degrees    2020 10:55 AM EDT by Jose Lawson      semi fowlers    (X) Possible oxygen    2020 10:55 AM EDT by Jose Lawson      3 L NC    * Milestone    Additional Notes    7/10/2020    Interval History Status: not changed.         Patient seen and examined         Patient feels weak short of breath received plasma tolerated well    Oxygen continue to be weaned down    Patient feels better    Patient denies fever chest pain       Current Meds:        ·    furosemide     20 mg    Oral    BID    ·    methylPREDNISolone     40 mg    Intravenous    Q12H    ·    sodium chloride     20 mL    Intravenous    Once    ·    enoxaparin     40 mg    Subcutaneous    BID    ·    amLODIPine     10 mg    Oral    Daily    ·    sodium chloride flush       Vitals:    BP (!) 160/108   Pulse 92   Temp 98.3 °F (36.8 °C) (Oral)   Resp 22   Ht 6' (1.829 m)   Wt (!) 366 lb 10 oz (166.3 kg)   SpO2 95%   BMI 49.72 kg/m²    Temp (24hrs), Av °F (37.2 °C), Min:98.3 °F (36.8 °C), Max:99.7 °F (37.6 °C)       No results for input(s): POCGLU in the last 72 hours.         I/O (24Hr):        Intake/Output Summary (Last 24 hours) at 7/10/2020 1128    Last data filed at 7/9/2020 1848       Gross per 24 hour    Intake 840 ml    Output -    Net 840 ml               Plan:                 CO VID 19 pneumonia patient tolerated 2 units of plasma transfusion very well         Severe sepsis secondary to CO VID 19 pneumonia patient on oxygen    Patient had hypoxia fever tachypnea and tachycardia         Obstructive sleep apnea continue home medication         Acute hypoxemic respiratory failure secondary to CO VID 19 pneumonia wean off oxygen         Anticipate discharge in a.m.          7/10/2020 02:37    WBC: 6.6    RBC: 5.03    Hemoglobin Quant: 14.3    Hematocrit: 44.6    MCV: 88.7    MCH: 28.4    MCHC: 32.1    MPV: 11.8    RDW: 13.2    Platelet Count: 591    Platelet Estimate: NOT REPORTED    Absolute Mono #: 0.20    Eosinophils %: 0 (L)    Basophils Absolute: 0.00    Differential Type: NOT REPORTED    Seg Neutrophils: 91 (H)    Segs Absolute: 6.00    Lymphocytes: 6 (L)    Absolute Lymph #: 0.40 (L)    Monocytes: 3    Absolute Eos #: 0.00    Basophils: 0    Immature Granulocytes: 0    WBC Morphology: NOT REPORTED    RBC Morphology: NOT REPORTED    Morphology: Normal    Absolute Immature Granulocyte: 0.00    NRBC Automated: 0.0

## 2021-08-24 NOTE — CARE COORDINATION
Case Management Initial Discharge [address] [place], [place] with:patient to discuss discharge plans. Information verified: address, contacts, phone number, , insurance Yes    Emergency Contact/Next of Kin name & number: Boykin Ganser [phone]    PCP: Unique Silva MD  Date of last visit:     Insurance Provider: Jordy Spivey 150    Discharge Planning    Living Arrangements:  Alone   Support Systems:  Spouse/Significant Other, Parent, Family Members, Children    Home has 2 stories  3 stairs to climb to get into front door, 1 flight stairs to climb to reach second floor  Location of bedroom/bathroom in home main    Patient able to perform ADL's:Independent    Current Services (outpatient & in home) none  DME equipment: none  DME provider: n/a    Receiving oral anticoagulation therapy? No    If indicated:   Physician managing anticoagulation treatment: n/a  Where does patient obtain lab work for ATC treatment? n/a      Potential Assistance Needed:  N/A    Patient agreeable to home care: No  [place] of choice provided:  n/a    Prior SNF/Rehab Placement and Facility: none  Agreeable to SNF/Rehab: No  [place] of choice provided: n/a     Evaluation: n/a    Expected Discharge date:       Patient expects to be discharged to:  Home  Follow Up Appointment: Best Day/ Time:      Transportation provider: family or friend  Transportation arrangements needed for discharge: No    Readmission Risk              Risk of Unplanned Readmission:        7             Does patient have a readmission risk score greater than 14?: No  If yes, follow-up appointment must be made within 7 days of discharge. Goals of Care: improve respiratory symptoms and status      Discharge Plan: home independent, covid +, family to transport home.            Electronically signed by Robin Perales RN on 20 at 5:50 PM EDT 1.29

## 2022-07-19 ENCOUNTER — HOSPITAL ENCOUNTER (OUTPATIENT)
Age: 42
Setting detail: SPECIMEN
Discharge: HOME | End: 2022-07-19

## 2022-07-19 ENCOUNTER — OFFICE VISIT (OUTPATIENT)
Dept: PRIMARY CARE CLINIC | Age: 42
End: 2022-07-19
Payer: COMMERCIAL

## 2022-07-19 VITALS
BODY MASS INDEX: 42.66 KG/M2 | WEIGHT: 315 LBS | RESPIRATION RATE: 16 BRPM | HEIGHT: 72 IN | DIASTOLIC BLOOD PRESSURE: 94 MMHG | SYSTOLIC BLOOD PRESSURE: 148 MMHG | OXYGEN SATURATION: 93 % | HEART RATE: 102 BPM

## 2022-07-19 DIAGNOSIS — Z13.220 ENCOUNTER FOR LIPID SCREENING FOR CARDIOVASCULAR DISEASE: ICD-10-CM

## 2022-07-19 DIAGNOSIS — Z76.89 ENCOUNTER TO ESTABLISH CARE: Primary | ICD-10-CM

## 2022-07-19 DIAGNOSIS — E66.01 MORBID OBESITY WITH BMI OF 50.0-59.9, ADULT (HCC): ICD-10-CM

## 2022-07-19 DIAGNOSIS — Z13.1 SCREENING FOR DIABETES MELLITUS: ICD-10-CM

## 2022-07-19 DIAGNOSIS — J30.9 ALLERGIC RHINITIS, UNSPECIFIED SEASONALITY, UNSPECIFIED TRIGGER: ICD-10-CM

## 2022-07-19 DIAGNOSIS — R06.02 SOB (SHORTNESS OF BREATH): Primary | ICD-10-CM

## 2022-07-19 DIAGNOSIS — E55.9 VITAMIN D DEFICIENCY: ICD-10-CM

## 2022-07-19 DIAGNOSIS — I10 PRIMARY HYPERTENSION: ICD-10-CM

## 2022-07-19 DIAGNOSIS — Z13.29 THYROID DISORDER SCREEN: ICD-10-CM

## 2022-07-19 DIAGNOSIS — R06.02 SOB (SHORTNESS OF BREATH): ICD-10-CM

## 2022-07-19 DIAGNOSIS — Z13.6 ENCOUNTER FOR LIPID SCREENING FOR CARDIOVASCULAR DISEASE: ICD-10-CM

## 2022-07-19 DIAGNOSIS — Z13.0 SCREENING FOR DEFICIENCY ANEMIA: ICD-10-CM

## 2022-07-19 DIAGNOSIS — G47.33 OSA (OBSTRUCTIVE SLEEP APNEA): ICD-10-CM

## 2022-07-19 DIAGNOSIS — R10.32 LEFT LOWER QUADRANT ABDOMINAL PAIN: ICD-10-CM

## 2022-07-19 LAB
ABSOLUTE EOS #: 0.18 K/UL (ref 0–0.44)
ABSOLUTE IMMATURE GRANULOCYTE: <0.03 K/UL (ref 0–0.3)
ABSOLUTE LYMPH #: 2.24 K/UL (ref 1.1–3.7)
ABSOLUTE MONO #: 0.44 K/UL (ref 0.1–1.2)
ALBUMIN SERPL-MCNC: 4 G/DL (ref 3.5–5.2)
ALBUMIN/GLOBULIN RATIO: 1.2 (ref 1–2.5)
ALP BLD-CCNC: 86 U/L (ref 40–129)
ALT SERPL-CCNC: 27 U/L (ref 5–41)
ANION GAP SERPL CALCULATED.3IONS-SCNC: 14 MMOL/L (ref 9–17)
AST SERPL-CCNC: 19 U/L
BASOPHILS # BLD: 1 % (ref 0–2)
BASOPHILS ABSOLUTE: 0.05 K/UL (ref 0–0.2)
BILIRUB SERPL-MCNC: 0.46 MG/DL (ref 0.3–1.2)
BUN BLDV-MCNC: 12 MG/DL (ref 6–20)
CALCIUM SERPL-MCNC: 9.2 MG/DL (ref 8.6–10.4)
CHLORIDE BLD-SCNC: 103 MMOL/L (ref 98–107)
CHOLESTEROL/HDL RATIO: 3.6
CHOLESTEROL: 182 MG/DL
CO2: 26 MMOL/L (ref 20–31)
CREAT SERPL-MCNC: 1.07 MG/DL (ref 0.7–1.2)
D-DIMER QUANTITATIVE: 0.75 MG/L FEU
EOSINOPHILS RELATIVE PERCENT: 2 % (ref 1–4)
ESTIMATED AVERAGE GLUCOSE: 126 MG/DL
FOLATE: 11.9 NG/ML
GFR AFRICAN AMERICAN: >60 ML/MIN
GFR NON-AFRICAN AMERICAN: >60 ML/MIN
GFR SERPL CREATININE-BSD FRML MDRD: NORMAL ML/MIN/{1.73_M2}
GLUCOSE BLD-MCNC: 95 MG/DL (ref 70–99)
HBA1C MFR BLD: 6 % (ref 4–6)
HCT VFR BLD CALC: 46.5 % (ref 40.7–50.3)
HDLC SERPL-MCNC: 50 MG/DL
HEMOGLOBIN: 14.9 G/DL (ref 13–17)
IMMATURE GRANULOCYTES: 0 %
LDL CHOLESTEROL: 118 MG/DL (ref 0–130)
LYMPHOCYTES # BLD: 30 % (ref 24–43)
MCH RBC QN AUTO: 29 PG (ref 25.2–33.5)
MCHC RBC AUTO-ENTMCNC: 32 G/DL (ref 28.4–34.8)
MCV RBC AUTO: 90.5 FL (ref 82.6–102.9)
MONOCYTES # BLD: 6 % (ref 3–12)
NRBC AUTOMATED: 0 PER 100 WBC
PDW BLD-RTO: 14.1 % (ref 11.8–14.4)
PLATELET # BLD: 303 K/UL (ref 138–453)
PMV BLD AUTO: 11.3 FL (ref 8.1–13.5)
POTASSIUM SERPL-SCNC: 3.7 MMOL/L (ref 3.7–5.3)
PRO-BNP: 826 PG/ML
RBC # BLD: 5.14 M/UL (ref 4.21–5.77)
SEG NEUTROPHILS: 61 % (ref 36–65)
SEGMENTED NEUTROPHILS ABSOLUTE COUNT: 4.56 K/UL (ref 1.5–8.1)
SODIUM BLD-SCNC: 143 MMOL/L (ref 135–144)
TOTAL PROTEIN: 7.4 G/DL (ref 6.4–8.3)
TRIGL SERPL-MCNC: 72 MG/DL
TSH SERPL DL<=0.05 MIU/L-ACNC: 2.11 UIU/ML (ref 0.3–5)
VITAMIN B-12: 770 PG/ML (ref 232–1245)
VITAMIN D 25-HYDROXY: 8.8 NG/ML
WBC # BLD: 7.5 K/UL (ref 3.5–11.3)

## 2022-07-19 PROCEDURE — 99205 OFFICE O/P NEW HI 60 MIN: CPT | Performed by: PHYSICIAN ASSISTANT

## 2022-07-19 RX ORDER — METOPROLOL SUCCINATE 25 MG/1
TABLET, EXTENDED RELEASE ORAL
Qty: 90 TABLET | Refills: 1 | Status: SHIPPED | OUTPATIENT
Start: 2022-07-19 | End: 2022-08-05

## 2022-07-19 RX ORDER — AMLODIPINE BESYLATE 10 MG/1
10 TABLET ORAL DAILY
Qty: 90 TABLET | Refills: 1 | Status: SHIPPED | OUTPATIENT
Start: 2022-07-19

## 2022-07-19 RX ORDER — FLUTICASONE PROPIONATE 50 MCG
1 SPRAY, SUSPENSION (ML) NASAL DAILY
Qty: 1 EACH | Refills: 1 | Status: SHIPPED | OUTPATIENT
Start: 2022-07-19

## 2022-07-19 RX ORDER — METOPROLOL SUCCINATE 25 MG/1
TABLET, EXTENDED RELEASE ORAL
COMMUNITY
End: 2022-07-19 | Stop reason: SDUPTHER

## 2022-07-19 RX ORDER — LISINOPRIL AND HYDROCHLOROTHIAZIDE 25; 20 MG/1; MG/1
1 TABLET ORAL DAILY
Qty: 90 TABLET | Refills: 1 | Status: SHIPPED | OUTPATIENT
Start: 2022-07-19 | End: 2022-08-05

## 2022-07-19 RX ORDER — ALBUTEROL SULFATE 90 UG/1
2 AEROSOL, METERED RESPIRATORY (INHALATION) 4 TIMES DAILY PRN
Qty: 3 EACH | Refills: 0 | Status: CANCELLED | OUTPATIENT
Start: 2022-07-19

## 2022-07-19 SDOH — ECONOMIC STABILITY: TRANSPORTATION INSECURITY
IN THE PAST 12 MONTHS, HAS LACK OF TRANSPORTATION KEPT YOU FROM MEETINGS, WORK, OR FROM GETTING THINGS NEEDED FOR DAILY LIVING?: NO

## 2022-07-19 SDOH — ECONOMIC STABILITY: FOOD INSECURITY: WITHIN THE PAST 12 MONTHS, YOU WORRIED THAT YOUR FOOD WOULD RUN OUT BEFORE YOU GOT MONEY TO BUY MORE.: NEVER TRUE

## 2022-07-19 SDOH — ECONOMIC STABILITY: FOOD INSECURITY: WITHIN THE PAST 12 MONTHS, THE FOOD YOU BOUGHT JUST DIDN'T LAST AND YOU DIDN'T HAVE MONEY TO GET MORE.: NEVER TRUE

## 2022-07-19 SDOH — ECONOMIC STABILITY: TRANSPORTATION INSECURITY
IN THE PAST 12 MONTHS, HAS THE LACK OF TRANSPORTATION KEPT YOU FROM MEDICAL APPOINTMENTS OR FROM GETTING MEDICATIONS?: NO

## 2022-07-19 ASSESSMENT — PATIENT HEALTH QUESTIONNAIRE - PHQ9
SUM OF ALL RESPONSES TO PHQ QUESTIONS 1-9: 0
1. LITTLE INTEREST OR PLEASURE IN DOING THINGS: 0
SUM OF ALL RESPONSES TO PHQ QUESTIONS 1-9: 0
SUM OF ALL RESPONSES TO PHQ9 QUESTIONS 1 & 2: 0
2. FEELING DOWN, DEPRESSED OR HOPELESS: 0

## 2022-07-19 ASSESSMENT — SOCIAL DETERMINANTS OF HEALTH (SDOH): HOW HARD IS IT FOR YOU TO PAY FOR THE VERY BASICS LIKE FOOD, HOUSING, MEDICAL CARE, AND HEATING?: NOT HARD AT ALL

## 2022-07-19 NOTE — PROGRESS NOTES
[facility] PRIMARY CARE  [facility] [address] [facility] [address]  [address] [address]  Dept: [phone]  Dept Fax: [phone]    Jeff Ovalle is a 36 y.o. male who presents today for his medical conditions/complaints as noted below. Chief Complaint   Patient presents with    Establish Care     Discuss shortness of breath and blood pressure, discuss referral to bariatric surgery discuss starting adipex, left side flank pain x 2 months       HPI:     Patient presents to the office to establish care. No recent PCP. He has past medical history including; obesity, hypertension, JOE, GERD, hyperlipidemia. Today, patient reports that he has been out of medication for several months. His blood pressure has been uncontrolled at home. He does admit to shortness of breath. This is mostly present with activity. It has worsened the past couple months. Denies any current chest pain, syncope, dizziness. He has history of JOE, however is not using a sleep device as it is old. He admits to significant snoring, fatigue, poor sleep. Patient is very interested in weight loss. He would like to discuss adipex or bariatric surgery. He does not participate in regular physical activity. Patient also has concern for intermittent left lower abdominal pain. He feels that he \"pulled\" a muscle. The pain is most noticed with stretching. Denies any changes in bowel/bladder habits. No flank pain, fevers, chills, nausea, vomiting. No known injury. No recent screening blood work or health maintenance. Of note, patient had significant COVID infection with pneumonia in . He was admitted to [facility] for 5 days.              Hemoglobin A1C (%)   Date Value   2022 6.0   10/16/2013 5.6             ( goal A1C is < 7)   No results found for: LABMICR  LDL Cholesterol (mg/dL)   Date Value   2022 118   10/17/2013 96       (goal LDL is <100)   AST (U/L)   Date Value 07/19/2022 19     ALT (U/L)   Date Value   07/19/2022 27     BUN (mg/dL)   Date Value   07/21/2022 15     BP Readings from Last 3 Encounters:   07/21/22 114/85   07/19/22 (!) 148/94   07/11/20 (!) 151/94          (goal 120/80)    Past Medical History:   Diagnosis Date    Acute respiratory failure with hypoxia ([facility] 7/6/2020    GERD (gastroesophageal reflux disease)     Headache(784.0)     Hyperlipidemia     Hypertension     Morbid obesity with BMI of 45.0-49.9, adult ([facility]     Patient in clinical research study 07/07/2020    Convalescent plasma study day of completion 7/11/20    Unilateral paresis ([facility] 10/29/2013    Unspecified sleep apnea     Noncompliant with CPAP      Past Surgical History:   Procedure Laterality Date    DENTAL SURGERY         Family History   Problem Relation Age of Onset    Diabetes Mother     High Blood Pressure Father     Cancer Maternal Grandmother        Social History     Tobacco Use    Smoking status: Never    Smokeless tobacco: Never   Substance Use Topics    Alcohol use: Yes     Alcohol/week: 0.0 standard drinks     Comment: socially      Current Outpatient Medications   Medication Sig Dispense Refill    metoprolol succinate (TOPROL XL) 25 MG extended release tablet metoprolol succinate ER 25 mg tablet,extended release 24 hr  TAKE 1 TABLET BY MOUTH TABLET EVERY DAY 90 tablet 1    amLODIPine (NORVASC) 10 MG tablet Take 1 tablet by mouth in the morning. 90 tablet 1    fluticasone (FLONASE) 50 MCG/ACT nasal spray 1 spray by Each Nostril route in the morning. 1 each 1    lisinopril-hydroCHLOROthiazide (PRINZIDE;ZESTORETIC) 20-25 MG per tablet Take 1 tablet by mouth in the morning. 90 tablet 1    albuterol sulfate HFA (VENTOLIN HFA) 108 (90 Base) MCG/ACT inhaler Inhale 2 puffs into the lungs 4 times daily as needed for Wheezing 3 Inhaler 0     No current facility-administered medications for this visit.      Allergies   Allergen Reactions    Cephalexin     Pcn [Penicillins]        Health Maintenance   Topic Date Due    HIV screen  Never done    Hepatitis C screen  Never done    Flu vaccine (1) 09/01/2022    A1C test (Diabetic or Prediabetic)  07/19/2023    Depression Screen  07/19/2023    Lipids  07/19/2027    DTaP/Tdap/Td vaccine (2 - Td or Tdap) 05/01/2031    COVID-19 Vaccine  Completed    Hepatitis A vaccine  Aged Out    Hepatitis B vaccine  Aged Out    Hib vaccine  Aged Out    Meningococcal (ACWY) vaccine  Aged Out    Pneumococcal 0-64 years Vaccine  Aged Out       Subjective:      Review of Systems   Constitutional:  Positive for fatigue. Negative for chills and fever. HENT:  Negative for congestion, rhinorrhea and sinus pain. Eyes:  Negative for pain. Respiratory:  Positive for cough and shortness of breath. Cardiovascular:  Negative for chest pain and leg swelling. Gastrointestinal:  Positive for abdominal pain. Negative for constipation, diarrhea, nausea and vomiting. Genitourinary:  Negative for difficulty urinating, enuresis and testicular pain. Musculoskeletal:  Negative for arthralgias, back pain and myalgias. Skin:  Negative for rash. Neurological:  Negative for dizziness and headaches. Psychiatric/Behavioral:  Negative for confusion and sleep disturbance. The patient is not nervous/anxious. Objective:     Physical Exam  Vitals and nursing note reviewed. Constitutional:       General: He is not in acute distress. Appearance: Normal appearance. He is obese. HENT:      Head: Normocephalic. Mouth/Throat:      Mouth: Mucous membranes are moist.   Eyes:      Extraocular Movements: Extraocular movements intact. Conjunctiva/sclera: Conjunctivae normal.      Pupils: Pupils are equal, round, and reactive to light. Cardiovascular:      Rate and Rhythm: Normal rate and regular rhythm. Pulses: Normal pulses. Heart sounds: Normal heart sounds. No murmur heard. Pulmonary:      Effort: Pulmonary effort is normal. No respiratory distress. Breath sounds: Normal breath sounds. Abdominal:      General: Abdomen is flat. Bowel sounds are normal.      Palpations: Abdomen is soft. Tenderness: There is no abdominal tenderness. There is no right CVA tenderness, left CVA tenderness, guarding or rebound. Comments: LLQ pain is reproduced with movement of left lower extremity (hip ROM). Musculoskeletal:      Cervical back: Normal range of motion. Right lower leg: No edema. Left lower leg: No edema. Lymphadenopathy:      Cervical: No cervical adenopathy. Skin:     General: Skin is warm. Capillary Refill: Capillary refill takes less than 2 seconds. Neurological:      General: No focal deficit present. Mental Status: He is alert and oriented to person, place, and time. Psychiatric:         Mood and Affect: Mood normal.         Behavior: Behavior normal.     BP (!) 148/94   Pulse (!) 102   Resp 16   Ht 6' (1.829 m)   Wt (!) 377 lb (171 kg)   SpO2 93%   BMI 51.13 kg/m²     Assessment:       ICD-10-CM    1. Encounter to establish care  Z76.89       2. SOB (shortness of breath)  R06.02 D-Dimer, Quantitative     Brain Natriuretic Peptide      3. JOE (obstructive sleep apnea)  G47.33 Sleep Study with PAP Titration      4. HTN (hypertension)  I10 metoprolol succinate (TOPROL XL) 25 MG extended release tablet     amLODIPine (NORVASC) 10 MG tablet     lisinopril-hydroCHLOROthiazide (PRINZIDE;ZESTORETIC) 20-25 MG per tablet      5. Allergic rhinitis, unspecified seasonality, unspecified trigger  J30.9 fluticasone (FLONASE) 50 MCG/ACT nasal spray      6. Morbid obesity with BMI of 50.0-59.9, adult [facility])  [address], Kay Posadas DO, Weight Management and Tae Darian [place], [place]    D79.92       7. Left lower quadrant abdominal pain  R10.32 CT ABDOMEN PELVIS W IV CONTRAST Additional Contrast? None      8. Screening for deficiency anemia  Z13.0 CBC with Auto Differential     Vitamin B12 & Folate      9.  Thyroid disorder screen Z13.29 TSH with Reflex      10. Encounter for lipid screening for cardiovascular disease  Z13.220 Lipid Panel    Z13.6 Comprehensive Metabolic Panel      11. Vitamin D deficiency  E55.9 Vitamin D 25 Hydroxy      12. Screening for diabetes mellitus  Z13.1 Hemoglobin A1C               Plan:      Initial visit to establish care. Patient with chronic SOB the last couple months. Plan to start with BNP, D-Dimer, basic labs. Pending labs may consider chest imaging and ECHO. Need to update sleep study and obtain new device. Patient is to restart BP medications including; metoprolol once daily, amlodipine once daily, lisinopril-hydrochlorothiazide once daily. He is given prescription of flonase for chronic allergies. Continue daily anti-histamine. I had a long discussion with patient about management of obesity. Advised that with current SOB and hypertension I do not recommend Adipex. He would like referral to bariatric specialist.  Patient given CT Abdomen order for persistent left lower abdominal pan. Discussed bland diet, staying well hydrated. Recommend monitoring for triggers. 8-12. Patient agreeable to several screening labs to rule out underlying disease. Return in about 1 week (around 7/26/2022) for medication recheck and discuss labs.     Orders Placed This Encounter   Procedures    CT ABDOMEN PELVIS W IV CONTRAST Additional Contrast? None     Standing Status:   Future     Standing Expiration Date:   7/19/2023     Order Specific Question:   Additional Contrast?     Answer:   None     Order Specific Question:   STAT Creatinine as needed:     Answer:   Yes     Order Specific Question:   Reason for exam:     Answer:   2 month left abdominal pain    CBC with Auto Differential     Standing Status:   Future     Number of Occurrences:   1     Standing Expiration Date:   7/19/2023    TSH with Reflex     Standing Status:   Future     Number of Occurrences:   1     Standing Expiration Date:   7/19/2023    Lipid Pre-Study Patient Questions: Answer: Others have witnessed episodes of apnea while the patient sleeps     Order Specific Question:   Pre-Study Patient Questions: Answer: Tosses and Turns all night or describes their sleep as restless     Order Specific Question:   Pre-Study Patient Questions: Answer:   Reports multiple awakenings throughout the night         Patient given educational materials - see patient instructions. Discussed use, benefit, and side effects of prescribed medications. All patient questions answered. Pt voiced understanding. Reviewed health maintenance. Instructed to continue current medications, diet and exercise. Patient agreed with treatment plan. Follow up as directed. Electronically signed by Magalie Yi PA-C on 7/24/2022 at 7:54 PM    I spent 60 minutes with patient encounter, including; reviewing prior medical history/charts, face to face, documentation, decision making.

## 2022-07-21 ENCOUNTER — APPOINTMENT (OUTPATIENT)
Dept: CT IMAGING | Age: 42
End: 2022-07-21
Payer: COMMERCIAL

## 2022-07-21 ENCOUNTER — HOSPITAL ENCOUNTER (EMERGENCY)
Age: 42
Discharge: HOME | End: 2022-07-21
Attending: EMERGENCY MEDICINE
Payer: COMMERCIAL

## 2022-07-21 VITALS
BODY MASS INDEX: 42.66 KG/M2 | OXYGEN SATURATION: 94 % | TEMPERATURE: 98 F | SYSTOLIC BLOOD PRESSURE: 114 MMHG | DIASTOLIC BLOOD PRESSURE: 85 MMHG | HEIGHT: 72 IN | HEART RATE: 87 BPM | WEIGHT: 315 LBS | RESPIRATION RATE: 18 BRPM

## 2022-07-21 DIAGNOSIS — R06.02 SHORTNESS OF BREATH: Primary | ICD-10-CM

## 2022-07-21 LAB
ABSOLUTE EOS #: 0.14 K/UL (ref 0–0.44)
ABSOLUTE IMMATURE GRANULOCYTE: 0.03 K/UL (ref 0–0.3)
ABSOLUTE LYMPH #: 2.08 K/UL (ref 1.1–3.7)
ABSOLUTE MONO #: 0.43 K/UL (ref 0.1–1.2)
ANION GAP SERPL CALCULATED.3IONS-SCNC: 9 MMOL/L (ref 9–17)
BASOPHILS # BLD: 1 % (ref 0–2)
BASOPHILS ABSOLUTE: 0.04 K/UL (ref 0–0.2)
BUN BLDV-MCNC: 15 MG/DL (ref 6–20)
BUN/CREAT BLD: 13 (ref 9–20)
CALCIUM SERPL-MCNC: 8.9 MG/DL (ref 8.6–10.4)
CHLORIDE BLD-SCNC: 101 MMOL/L (ref 98–107)
CO2: 31 MMOL/L (ref 20–31)
CREAT SERPL-MCNC: 1.14 MG/DL (ref 0.7–1.2)
EOSINOPHILS RELATIVE PERCENT: 2 % (ref 1–4)
GFR AFRICAN AMERICAN: >60 ML/MIN
GFR NON-AFRICAN AMERICAN: >60 ML/MIN
GFR SERPL CREATININE-BSD FRML MDRD: ABNORMAL ML/MIN/{1.73_M2}
GLUCOSE BLD-MCNC: 111 MG/DL (ref 70–99)
HCT VFR BLD CALC: 48.4 % (ref 40.7–50.3)
HEMOGLOBIN: 15 G/DL (ref 13–17)
IMMATURE GRANULOCYTES: 0 %
LYMPHOCYTES # BLD: 28 % (ref 24–43)
MCH RBC QN AUTO: 27.9 PG (ref 25.2–33.5)
MCHC RBC AUTO-ENTMCNC: 31 G/DL (ref 28.4–34.8)
MCV RBC AUTO: 90.1 FL (ref 82.6–102.9)
MONOCYTES # BLD: 6 % (ref 3–12)
MYOGLOBIN: 57 NG/ML (ref 28–72)
NRBC AUTOMATED: 0 PER 100 WBC
PDW BLD-RTO: 13.9 % (ref 11.8–14.4)
PLATELET # BLD: 273 K/UL (ref 138–453)
PMV BLD AUTO: 10.2 FL (ref 8.1–13.5)
POTASSIUM SERPL-SCNC: 3.5 MMOL/L (ref 3.7–5.3)
RBC # BLD: 5.37 M/UL (ref 4.21–5.77)
SEG NEUTROPHILS: 63 % (ref 36–65)
SEGMENTED NEUTROPHILS ABSOLUTE COUNT: 4.79 K/UL (ref 1.5–8.1)
SODIUM BLD-SCNC: 141 MMOL/L (ref 135–144)
TROPONIN, HIGH SENSITIVITY: 22 NG/L (ref 0–22)
WBC # BLD: 7.5 K/UL (ref 3.5–11.3)

## 2022-07-21 PROCEDURE — 84484 ASSAY OF TROPONIN QUANT: CPT

## 2022-07-21 PROCEDURE — 80048 BASIC METABOLIC PNL TOTAL CA: CPT

## 2022-07-21 PROCEDURE — 85025 COMPLETE CBC W/AUTO DIFF WBC: CPT

## 2022-07-21 PROCEDURE — 6360000004 HC RX CONTRAST MEDICATION: Performed by: EMERGENCY MEDICINE

## 2022-07-21 PROCEDURE — 83874 ASSAY OF MYOGLOBIN: CPT

## 2022-07-21 PROCEDURE — 99285 EMERGENCY DEPT VISIT HI MDM: CPT

## 2022-07-21 PROCEDURE — 2580000003 HC RX 258: Performed by: EMERGENCY MEDICINE

## 2022-07-21 PROCEDURE — 71260 CT THORAX DX C+: CPT | Performed by: PHYSICIAN ASSISTANT

## 2022-07-21 PROCEDURE — 93005 ELECTROCARDIOGRAM TRACING: CPT | Performed by: PHYSICIAN ASSISTANT

## 2022-07-21 RX ORDER — SODIUM CHLORIDE 0.9 % (FLUSH) 0.9 %
10 SYRINGE (ML) INJECTION PRN
Status: DISCONTINUED | OUTPATIENT
Start: 2022-07-21 | End: 2022-07-21 | Stop reason: HOSPADM

## 2022-07-21 RX ORDER — 0.9 % SODIUM CHLORIDE 0.9 %
80 INTRAVENOUS SOLUTION INTRAVENOUS ONCE
Status: DISCONTINUED | OUTPATIENT
Start: 2022-07-21 | End: 2022-07-21 | Stop reason: HOSPADM

## 2022-07-21 RX ADMIN — Medication 80 ML: at 13:09

## 2022-07-21 RX ADMIN — SODIUM CHLORIDE, PRESERVATIVE FREE 10 ML: 5 INJECTION INTRAVENOUS at 13:09

## 2022-07-21 RX ADMIN — IOPAMIDOL 75 ML: 755 INJECTION, SOLUTION INTRAVENOUS at 13:09

## 2022-07-21 ASSESSMENT — PAIN - FUNCTIONAL ASSESSMENT: PAIN_FUNCTIONAL_ASSESSMENT: 0-10

## 2022-07-21 ASSESSMENT — PAIN SCALES - GENERAL: PAINLEVEL_OUTOF10: 0

## 2022-07-21 NOTE — ED PROVIDER NOTES
EMERGENCY DEPARTMENT ENCOUNTER   ATTENDING ATTESTATION     Pt Name: Sean Wiseman  MRN: [id number]  [place] 1980  Date of evaluation: 7/21/22   Sean Wiseman is a 36 y.o. male with CC: Shortness of Breath (Sent by PCP for possible PE; S/S x 2 weeks)    MDM:   The patient is a 53-year-old male who presented to the emergency department on advice of his PCP to rule out PE. Patient has had shortness of breath for several weeks. No acute findings laboratory analysis, CT negative for PE. Patient is discharged home for continued outpatient follow-up and given parameters to return to the emergency department. This visit was performed by both a physician and an APC. I personally evaluated and examined the patient. I performed all aspects of the MDM as documented. CRITICAL CARE:       EKG: All EKG's are interpreted by the Emergency Department Physician who either signs or Co-signs this chart in the absence of a cardiologist.      RADIOLOGY:All plain film, CT, MRI, and formal ultrasound images (except ED bedside ultrasound) are read by the radiologist, see reports below, unless otherwise noted in MDM or here. CT CHEST PULMONARY EMBOLISM W CONTRAST   Final Result   1. No pulmonary embolism or other acute process in the chest.   2. Cardiomegaly. LABS: All lab results were reviewed by myself, and all abnormals are listed below. Labs Reviewed   BASIC METABOLIC PANEL - Abnormal; Notable for the following components:       Result Value    Glucose 111 (*)     Potassium 3.5 (*)     All other components within normal limits   CBC WITH AUTO DIFFERENTIAL   TROP/MYOGLOBIN   TROP/MYOGLOBIN   TROP/MYOGLOBIN     CONSULTS:  None  FINAL IMPRESSION    No diagnosis found.         PASTMEDICAL HISTORY     Past Medical History:   Diagnosis Date    GERD (gastroesophageal reflux disease)     Headache(784.0)     Hyperlipidemia     Hypertension     Morbid obesity with BMI of 45.0-49.9, adult [facility])     Patient in clinical research study 07/07/2020    Convalescent plasma study day of completion 7/11/20    Unspecified sleep apnea     Noncompliant with CPAP     SURGICAL HISTORY       Past Surgical History:   Procedure Laterality Date    DENTAL SURGERY       CURRENT MEDICATIONS       Previous Medications    ALBUTEROL SULFATE HFA (VENTOLIN HFA) 108 (90 BASE) MCG/ACT INHALER    Inhale 2 puffs into the lungs 4 times daily as needed for Wheezing    AMLODIPINE (NORVASC) 10 MG TABLET    Take 1 tablet by mouth in the morning. FLUTICASONE (FLONASE) 50 MCG/ACT NASAL SPRAY    1 spray by Each Nostril route in the morning. LISINOPRIL-HYDROCHLOROTHIAZIDE (PRINZIDE;ZESTORETIC) 20-25 MG PER TABLET    Take 1 tablet by mouth in the morning. METOPROLOL SUCCINATE (TOPROL XL) 25 MG EXTENDED RELEASE TABLET    metoprolol succinate ER 25 mg tablet,extended release 24 hr  TAKE 1 TABLET BY MOUTH TABLET EVERY DAY     ALLERGIES     is allergic to cephalexin and pcn [penicillins]. FAMILY HISTORY     He indicated that the status of his mother is unknown. He indicated that the status of his father is unknown. He indicated that the status of his maternal grandmother is unknown. SOCIAL HISTORY       Social History     Tobacco Use    Smoking status: Never    Smokeless tobacco: Never   Substance Use Topics    Alcohol use: Yes     Alcohol/week: 0.0 standard drinks     Comment: socially    Drug use: No          Quinn Ellis MD  The care is provided during an unprecedented national emergency due to the novel coronavirus, COVID 19.   Attending Emergency Physician          Quinn Ellis MD  11/71/80 2750

## 2022-07-21 NOTE — ED PROVIDER NOTES
[address] [place] [address] ED  [facility]      Pt Name: Kirill Matias  MRN: [id number]  [place] 1980  Date ofevaluation: 7/21/2022  Provider: Hunter Arguelles PA-C    CHIEF COMPLAINT       Chief Complaint   Patient presents with    Shortness of Breath     Sent by PCP for possible PE; S/S x 2 weeks         HISTORY OF PRESENT ILLNESS  (Location/Symptom, Timing/Onset, Context/Setting, Quality, Duration, Modifying Factors, Severity.)   Kirill Matias is a 36 y.o. male who presents to the emergency department with shortness of breath for several months. Patient denies any chest pain. He states his doctor called him and informed him to come to the emergency room to rule out pulmonary embolism. Denies any worsening shortness of breath. Reports that he is doing a lot better with the care of his new doctor in terms of his blood pressure control. Denies any fever chills. No nausea vomiting or cough. Nursing Notes were reviewed. ALLERGIES     Cephalexin and Pcn [penicillins]    CURRENT MEDICATIONS       Previous Medications    ALBUTEROL SULFATE HFA (VENTOLIN HFA) 108 (90 BASE) MCG/ACT INHALER    Inhale 2 puffs into the lungs 4 times daily as needed for Wheezing    AMLODIPINE (NORVASC) 10 MG TABLET    Take 1 tablet by mouth in the morning. FLUTICASONE (FLONASE) 50 MCG/ACT NASAL SPRAY    1 spray by Each Nostril route in the morning. LISINOPRIL-HYDROCHLOROTHIAZIDE (PRINZIDE;ZESTORETIC) 20-25 MG PER TABLET    Take 1 tablet by mouth in the morning.     METOPROLOL SUCCINATE (TOPROL XL) 25 MG EXTENDED RELEASE TABLET    metoprolol succinate ER 25 mg tablet,extended release 24 hr  TAKE 1 TABLET BY MOUTH TABLET EVERY DAY       PAST MEDICAL HISTORY         Diagnosis Date    GERD (gastroesophageal reflux disease)     Headache(784.0)     Hyperlipidemia     Hypertension     Morbid obesity with BMI of 45.0-49.9, adult [facility])     Patient in clinical research study 07/07/2020    Convalescent plasma study day of completion 7/11/20    Unspecified sleep apnea     Noncompliant with CPAP       SURGICAL HISTORY           Procedure Laterality Date    DENTAL SURGERY           FAMILY HISTORY           Problem Relation Age of Onset    Diabetes Mother     High Blood Pressure Father     Cancer Maternal Grandmother      Family Status   Relation Name Status    Mother  (Not Specified)    Father  (Not Specified)    MGM  (Not Specified)        SOCIAL HISTORY      reports that he has never smoked. He has never used smokeless tobacco. He reports current alcohol use. He reports that he does not use drugs. REVIEW OFSYSTEMS    (2-9 systems for level 4, 10 or more for level 5)   Review of Systems    Except as noted above the remainder of the review of systems was reviewed and negative. PHYSICAL EXAM    (up to 7 for level 4, 8 or more for level 5)     ED Triage Vitals [07/21/22 1139]   BP Temp Temp Source Heart Rate Resp SpO2 Height Weight   (!) 166/126 98 °F (36.7 °C) Oral 93 15 94 % 6' (1.829 m) (!) 377 lb (171 kg)      Physical Exam  Constitutional:       Appearance: He is well-developed. HENT:      Head: Normocephalic and atraumatic. Cardiovascular:      Rate and Rhythm: Normal rate and regular rhythm. Pulmonary:      Effort: Pulmonary effort is normal.      Breath sounds: Normal breath sounds. Abdominal:      Palpations: Abdomen is soft. Musculoskeletal:         General: Normal range of motion. Cervical back: Normal range of motion and neck supple. Skin:     General: Skin is warm. Neurological:      Mental Status: He is alert and oriented to person, place, and time. Psychiatric:         Behavior: Behavior normal.               DIAGNOSTIC RESULTS     EKG: All EKG's are interpreted by the Emergency Department Physician who either signs or Co-signs this chart in the absence of a cardiologist.    No acute findings on EKG  Normal sinus rhythm. 88 bpm.  No ST segment elevation.   Lvh likey secondar to htn      RADIOLOGY:   Non-plain film images such as CT, Ultrasound and MRI are read by the radiologist. Plain radiographic images arevisualized and preliminarily interpreted by the emergency physician with the below findings:        Interpretation per the Radiologist below, if available at thetime of this note:          ED BEDSIDE ULTRASOUND:   Performed by ED Physician - none    LABS:  Labs Reviewed   BASIC METABOLIC PANEL - Abnormal; Notable for the following components:       Result Value    Glucose 111 (*)     Potassium 3.5 (*)     All other components within normal limits   CBC WITH AUTO DIFFERENTIAL   TROP/MYOGLOBIN   TROP/MYOGLOBIN   TROP/MYOGLOBIN       All other labs were within normal range or not returned as of this dictation. EMERGENCY DEPARTMENT COURSE and DIFFERENTIAL DIAGNOSIS/MDM:   Vitals:    Vitals:    07/21/22 1204 07/21/22 1219 07/21/22 1220 07/21/22 1245   BP:  (!) 136/94  114/85   Pulse: 87 85  87   Resp: 22 20 18   Temp:       TempSrc:       SpO2: 94%  91% 94%   Weight:       Height:         Will DC home. Patient negative for PE. Patient asymptomatic     CONSULTS:  None    PROCEDURES:  Procedures        FINAL IMPRESSION      1. Shortness of breath          DISPOSITION/PLAN   DISPOSITION Decision To Discharge 07/21/2022 02:01:33 PM      PATIENTREFERRED TO:    Reese, [address] [facility]  [address]  [facility] [address] [phone]    In 3 days      DISCHARGE MEDICATIONS:     New Prescriptions    No medications on file           (Please note that portions of this note were completed with a voice recognition program.  Efforts were made to edit thedictations but occasionally words are mis-transcribed.)    VICTORIA Escobedo PA-C  07/21/22 7269

## 2022-07-22 LAB
EKG ATRIAL RATE: 88 BPM
EKG P AXIS: 22 DEGREES
EKG P-R INTERVAL: 162 MS
EKG Q-T INTERVAL: 434 MS
EKG QRS DURATION: 114 MS
EKG QTC CALCULATION (BAZETT): 525 MS
EKG R AXIS: -35 DEGREES
EKG T AXIS: 79 DEGREES
EKG VENTRICULAR RATE: 88 BPM

## 2022-07-22 PROCEDURE — 93010 ELECTROCARDIOGRAM REPORT: CPT | Performed by: INTERNAL MEDICINE

## 2022-07-24 PROBLEM — J96.01 ACUTE RESPIRATORY FAILURE WITH HYPOXIA (HCC): Status: RESOLVED | Noted: 2020-07-06 | Resolved: 2022-07-24

## 2022-07-24 ASSESSMENT — ENCOUNTER SYMPTOMS
EYE PAIN: 0
BACK PAIN: 0
SHORTNESS OF BREATH: 1
ABDOMINAL PAIN: 1
SINUS PAIN: 0
NAUSEA: 0
DIARRHEA: 0
VOMITING: 0
CONSTIPATION: 0
COUGH: 1
RHINORRHEA: 0

## 2022-08-05 ENCOUNTER — TELEPHONE (OUTPATIENT)
Dept: PRIMARY CARE CLINIC | Age: 42
End: 2022-08-05

## 2022-08-05 ENCOUNTER — OFFICE VISIT (OUTPATIENT)
Dept: PRIMARY CARE CLINIC | Age: 42
End: 2022-08-05
Payer: COMMERCIAL

## 2022-08-05 VITALS
RESPIRATION RATE: 16 BRPM | BODY MASS INDEX: 42.66 KG/M2 | SYSTOLIC BLOOD PRESSURE: 124 MMHG | WEIGHT: 315 LBS | DIASTOLIC BLOOD PRESSURE: 88 MMHG | HEART RATE: 90 BPM | HEIGHT: 72 IN | OXYGEN SATURATION: 92 %

## 2022-08-05 DIAGNOSIS — G47.33 OSA (OBSTRUCTIVE SLEEP APNEA): Chronic | ICD-10-CM

## 2022-08-05 DIAGNOSIS — J18.9 PNEUMONIA DUE TO INFECTIOUS ORGANISM, UNSPECIFIED LATERALITY, UNSPECIFIED PART OF LUNG: ICD-10-CM

## 2022-08-05 DIAGNOSIS — E66.01 MORBID OBESITY WITH BMI OF 50.0-59.9, ADULT (HCC): ICD-10-CM

## 2022-08-05 DIAGNOSIS — I51.9 SEVERE LEFT VENTRICULAR SYSTOLIC DYSFUNCTION: Primary | ICD-10-CM

## 2022-08-05 DIAGNOSIS — I10 PRIMARY HYPERTENSION: Chronic | ICD-10-CM

## 2022-08-05 PROCEDURE — 99214 OFFICE O/P EST MOD 30 MIN: CPT | Performed by: PHYSICIAN ASSISTANT

## 2022-08-05 RX ORDER — FUROSEMIDE 40 MG/1
40 TABLET ORAL DAILY
COMMUNITY
Start: 2022-07-29 | End: 2023-07-29

## 2022-08-05 RX ORDER — SPIRONOLACTONE 25 MG/1
25 TABLET ORAL DAILY
COMMUNITY
Start: 2022-07-30 | End: 2023-07-30

## 2022-08-05 ASSESSMENT — PATIENT HEALTH QUESTIONNAIRE - PHQ9
2. FEELING DOWN, DEPRESSED OR HOPELESS: 1
SUM OF ALL RESPONSES TO PHQ9 QUESTIONS 1 & 2: 2
SUM OF ALL RESPONSES TO PHQ QUESTIONS 1-9: 2
SUM OF ALL RESPONSES TO PHQ QUESTIONS 1-9: 2
1. LITTLE INTEREST OR PLEASURE IN DOING THINGS: 1
SUM OF ALL RESPONSES TO PHQ QUESTIONS 1-9: 2
SUM OF ALL RESPONSES TO PHQ QUESTIONS 1-9: 2

## 2022-08-05 NOTE — TELEPHONE ENCOUNTER
I do not believe we have any samples of Entresto. Unfortunately this is a newer medication and is not generic. He may be able to find a coupon code on their website.

## 2022-08-05 NOTE — PROGRESS NOTES
[facility]  [facility] [address]  [address] [address]  Dept: [phone]  Dept Fax: [phone]    Rob Li is a 36 y.o. male who presents today for his medical conditions/complaints as noted below. Chief Complaint   Patient presents with    Follow-Up from Hospital     Discharged from [facility] on 07/28/2022 for Pneumonia and heart failure; needs note for return back to work       HPI:     Patient presents to the office for hospital follow-up. I reviewed extensive charts, labs, imaging from [facility].  Patient was admitted with acute pneumonia and heart failure. Cardiology was consulted and echocardiogram revealed significantly dilated left ventricle with EF between 25 and 30%. Cath procedure was performed and no stents were needed. Patient completed IV antibiotics while admitted to the hospital.  He was not discharged on any antibiotics. He was discharged on Entresto, metoprolol, spironolactone, Lasix. Unfortunately he has not been able to start Entresto due to cost and insurance coverage. Patient has close follow-up with cardiology this month. Today, patient reports that he is gradually recovering from hospital stay. His shortness of breath has improved. Denies any chest pain today. Denies any lightheadedness or dizziness. Patient understands that he needs to make major lifestyle changes to prevent and improve quality of chronic diseases. BP stable. Weight stable.       Hemoglobin A1C (%)   Date Value   07/19/2022 6.0   10/16/2013 5.6             ( goal A1C is < 7)   No results found for: LABMICR  LDL Cholesterol (mg/dL)   Date Value   07/19/2022 118   10/17/2013 96       (goal LDL is <100)   AST (U/L)   Date Value   07/19/2022 19     ALT (U/L)   Date Value   07/19/2022 27     BUN (mg/dL)   Date Value   07/21/2022 15     BP Readings from Last 3 Encounters:   08/05/22 124/88   07/21/22 114/85 07/19/22 (!) 148/94          (goal 120/80)    Past Medical History:   Diagnosis Date    Acute respiratory failure with hypoxia ([facility] 7/6/2020    GERD (gastroesophageal reflux disease)     Headache(784.0)     Hyperlipidemia     Hypertension     Morbid obesity with BMI of 45.0-49.9, adult ([facility]     Patient in clinical research study 07/07/2020    Convalescent plasma study day of completion 7/11/20    Unilateral paresis ([facility] 10/29/2013    Unspecified sleep apnea     Noncompliant with CPAP      Past Surgical History:   Procedure Laterality Date    DENTAL SURGERY         Family History   Problem Relation Age of Onset    Diabetes Mother     High Blood Pressure Father     Cancer Maternal Grandmother        Social History     Tobacco Use    Smoking status: Never    Smokeless tobacco: Never   Substance Use Topics    Alcohol use: Yes     Alcohol/week: 0.0 standard drinks     Comment: socially      Current Outpatient Medications   Medication Sig Dispense Refill    sacubitril-valsartan (ENTRESTO) 24-26 MG per tablet Take 1 tablet by mouth in the morning and 1 tablet before bedtime. spironolactone (ALDACTONE) 25 MG tablet Take 25 mg by mouth in the morning. furosemide (LASIX) 40 MG tablet Take 40 mg by mouth in the morning. amLODIPine (NORVASC) 10 MG tablet Take 1 tablet by mouth in the morning. 90 tablet 1    fluticasone (FLONASE) 50 MCG/ACT nasal spray 1 spray by Each Nostril route in the morning. 1 each 1    albuterol sulfate HFA (VENTOLIN HFA) 108 (90 Base) MCG/ACT inhaler Inhale 2 puffs into the lungs 4 times daily as needed for Wheezing 3 Inhaler 0     No current facility-administered medications for this visit.      Allergies   Allergen Reactions    Cephalexin     Pcn [Penicillins]        Health Maintenance   Topic Date Due    HIV screen  Never done    Hepatitis C screen  Never done    Flu vaccine (1) 09/01/2022    A1C test (Diabetic or Prediabetic)  07/19/2023    Depression Screen  08/05/2023    Lipids 07/28/2027    DTaP/Tdap/Td vaccine (2 - Td or Tdap) 05/01/2031    COVID-19 Vaccine  Completed    Hepatitis A vaccine  Aged Out    Hepatitis B vaccine  Aged Out    Hib vaccine  Aged Out    Meningococcal (ACWY) vaccine  Aged Out    Pneumococcal 0-64 years Vaccine  Aged Out       Subjective:      Review of Systems   Constitutional:  Positive for fatigue. Negative for chills and fever. HENT:  Negative for congestion, rhinorrhea and sinus pain. Eyes:  Negative for pain. Respiratory:  Positive for shortness of breath. Negative for cough. Cardiovascular:  Positive for chest pain. Negative for leg swelling. Gastrointestinal:  Negative for abdominal pain, constipation, diarrhea, nausea and vomiting. Genitourinary:  Negative for difficulty urinating, enuresis and testicular pain. Musculoskeletal:  Negative for arthralgias, back pain and myalgias. Skin:  Negative for rash. Neurological:  Negative for dizziness and headaches. Psychiatric/Behavioral:  Negative for confusion and sleep disturbance. The patient is not nervous/anxious. All other systems reviewed and are negative. Objective:     Physical Exam  Vitals and nursing note reviewed. Constitutional:       General: He is not in acute distress. Appearance: Normal appearance. He is obese. HENT:      Head: Normocephalic. Mouth/Throat:      Mouth: Mucous membranes are moist.   Eyes:      Extraocular Movements: Extraocular movements intact. Conjunctiva/sclera: Conjunctivae normal.      Pupils: Pupils are equal, round, and reactive to light. Cardiovascular:      Rate and Rhythm: Normal rate and regular rhythm. Pulses: Normal pulses. Heart sounds: Normal heart sounds. No murmur heard. Pulmonary:      Effort: Pulmonary effort is normal. No respiratory distress. Breath sounds: Normal breath sounds. Musculoskeletal:      Cervical back: Normal range of motion. Right lower leg: No edema.       Left lower leg: No edema.   Lymphadenopathy:      Cervical: No cervical adenopathy. Skin:     General: Skin is warm. Capillary Refill: Capillary refill takes less than 2 seconds. Neurological:      General: No focal deficit present. Mental Status: He is alert and oriented to person, place, and time. Psychiatric:         Mood and Affect: Mood normal.         Behavior: Behavior normal.     /88 (Site: Left Upper Arm, Position: Sitting, Cuff Size: Large Adult)   Pulse 90   Resp 16   Ht 6' (1.829 m)   Wt (!) 375 lb 6.1 oz (170.3 kg)   SpO2 92%   BMI 50.91 kg/m²     Assessment:       ICD-10-CM    1. Severe left ventricular systolic dysfunction  G28.5       2. Pneumonia due to infectious organism, unspecified laterality, unspecified part of lung  J18.9       3. Morbid obesity with BMI of 50.0-59.9, adult ([facility]  E66.01     Z68.43       4. JOE (obstructive sleep apnea)  G47.33       5. Primary hypertension  I10                Plan:      1. Patient with evidence of severe left ventricular systolic dysfunction/heart failure. He was started on Entresto, beta-blocker, spironolactone, Lasix. Patient to continue following with cardiology. There is no evidence of critical coronary artery disease. 2.  Patient with recent pneumococcal infection. He completed IV antibiotics while admitted to hospital.  Denies any persistent fevers. 3.  We had a long discussion about the importance of weight management going forward especially for chronic medical diseases. I strongly advise monitoring for excess portion sizes, carbohydrates, snacking. 4.  Patient instructed on importance of following with sleep medicine and CPAP device. 5.  Hypertension is stable. He is to continue current medications including amlodipine. Return in about 4 weeks (around 9/2/2022) for medication recheck, blood pressure. No orders of the defined types were placed in this encounter.         Patient given educational materials - see patient instructions. Discussed use, benefit, and side effects of prescribedmedications. All patient questions answered. Pt voiced understanding. Reviewed health maintenance. Instructed to continue current medications, diet and exercise. Patient agreed with treatment plan. Follow up as directed. Electronically signed by Alon Mckeon PA-C on 8/11/2022 at 6:43 AM    I spent a total of 40 minutes face to face with this patient. Over 50% of that time was spent on counseling and care coordination. Please see assessment and plan for details.

## 2022-08-05 NOTE — TELEPHONE ENCOUNTER
Patients wife called into office, wanting to know if we have any samples of entresto or something generic that is like that. Patients insurance is not active yet, and the hospital prescribed that for patient and they cant afford the out of pocket expense at the moment for that medication. Please advise.

## 2022-08-05 NOTE — LETTER
[facility]  [address]  [facility] [address]  Phone: [phone]  Fax: [phone] Shankar Tubbs PA-C        August 5, 2022     Patient: Kay Nieto   YOB: 1980   Date of Visit: 8/5/2022       To Whom It May Concern: It is my medical opinion that [employer] work 8/4 and 8/5. If you have any questions or concerns, please don't hesitate to call.     Sincerely,        Sylvia Sifuentes PA-C

## 2022-08-11 PROBLEM — I51.89 SEVERE LEFT VENTRICULAR SYSTOLIC DYSFUNCTION: Status: ACTIVE | Noted: 2022-08-11

## 2022-08-11 PROBLEM — I51.9 SEVERE LEFT VENTRICULAR SYSTOLIC DYSFUNCTION: Status: ACTIVE | Noted: 2022-08-11

## 2022-08-11 ASSESSMENT — ENCOUNTER SYMPTOMS
DIARRHEA: 0
EYE PAIN: 0
SINUS PAIN: 0
RHINORRHEA: 0
SHORTNESS OF BREATH: 1
ABDOMINAL PAIN: 0
VOMITING: 0
BACK PAIN: 0
COUGH: 0
CONSTIPATION: 0
NAUSEA: 0

## 2022-09-06 ENCOUNTER — OFFICE VISIT (OUTPATIENT)
Dept: PRIMARY CARE CLINIC | Age: 42
End: 2022-09-06
Payer: COMMERCIAL

## 2022-09-06 VITALS
OXYGEN SATURATION: 94 % | BODY MASS INDEX: 42.66 KG/M2 | HEIGHT: 72 IN | DIASTOLIC BLOOD PRESSURE: 84 MMHG | HEART RATE: 84 BPM | RESPIRATION RATE: 16 BRPM | SYSTOLIC BLOOD PRESSURE: 124 MMHG | WEIGHT: 315 LBS

## 2022-09-06 DIAGNOSIS — I10 PRIMARY HYPERTENSION: ICD-10-CM

## 2022-09-06 DIAGNOSIS — I42.8 NONISCHEMIC CARDIOMYOPATHY (HCC): Primary | ICD-10-CM

## 2022-09-06 DIAGNOSIS — E66.01 MORBID OBESITY WITH BMI OF 50.0-59.9, ADULT (HCC): ICD-10-CM

## 2022-09-06 DIAGNOSIS — R73.03 PREDIABETES: ICD-10-CM

## 2022-09-06 DIAGNOSIS — I25.110 ATHSCL HEART DISEASE OF NATIVE COR ART W UNSTABLE ANG PCTRS (HCC): ICD-10-CM

## 2022-09-06 PROBLEM — J18.9 PNEUMONIA: Status: ACTIVE | Noted: 2022-07-26

## 2022-09-06 PROBLEM — R06.02 SHORTNESS OF BREATH: Status: ACTIVE | Noted: 2022-03-18

## 2022-09-06 PROBLEM — Z99.81 DEPENDENCE ON SUPPLEMENTAL OXYGEN: Status: ACTIVE | Noted: 2022-03-18

## 2022-09-06 PROBLEM — R94.39 ABNORMAL RESULT OF OTHER CARDIOVASCULAR FUNCTION STUDY: Status: ACTIVE | Noted: 2022-03-18

## 2022-09-06 PROBLEM — R94.31 ABNORMAL ELECTROCARDIOGRAM (ECG) (EKG): Status: ACTIVE | Noted: 2022-03-18

## 2022-09-06 PROCEDURE — 99214 OFFICE O/P EST MOD 30 MIN: CPT | Performed by: PHYSICIAN ASSISTANT

## 2022-09-06 ASSESSMENT — ENCOUNTER SYMPTOMS
SHORTNESS OF BREATH: 0
ABDOMINAL PAIN: 0
CONSTIPATION: 0
BACK PAIN: 0
DIARRHEA: 0
VOMITING: 0
NAUSEA: 0
EYE PAIN: 0
SINUS PAIN: 0
RHINORRHEA: 0
COUGH: 0

## 2022-09-06 ASSESSMENT — PATIENT HEALTH QUESTIONNAIRE - PHQ9
SUM OF ALL RESPONSES TO PHQ QUESTIONS 1-9: 0
SUM OF ALL RESPONSES TO PHQ9 QUESTIONS 1 & 2: 0
SUM OF ALL RESPONSES TO PHQ QUESTIONS 1-9: 0
2. FEELING DOWN, DEPRESSED OR HOPELESS: 0
1. LITTLE INTEREST OR PLEASURE IN DOING THINGS: 0

## 2022-09-06 NOTE — PROGRESS NOTES
[address] PRIMARY CARE  [address] [facility] [address]  [address] [address]  Dept: [phone]  Dept Fax: [phone]    Robyn Segura is a 36 y.o. male who presents today for his medical conditions/complaints as noted below. Chief Complaint   Patient presents with    1 Month Follow-Up       HPI:     Patient presents the office for close follow-up. He is recently status post hospital stay for acute left ventricular cardiomyopathy/heart failure. He is following with cardiology appropriately. He is compliant with Entresto, amlodipine, spironolactone, Lasix. Patient does report modifying lifestyle meds to reduce fast food and increase activity. Despite these changes his weight has not gone down over the past month. In fact he is up a couple pounds. Patient reports that he is struggling with weight loss and understands that this is crucial to disease management. He reports that his energy levels and overall feeling has improved since starting Entresto. He has appointment with cardiology this month. No new or acute concerns. Denies any ongoing chest pain, shortness of breath, lightheadedness, dizziness. BP stable.       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 ([facility] [facility] 3/18/2022    GERD (gastroesophageal reflux disease)     Headache(784.0)     Hyperlipidemia     Hypertension Morbid obesity with BMI of 45.0-49.9, adult ([facility]     Patient in clinical research study 07/07/2020    Convalescent plasma study day of completion 7/11/20    Unilateral paresis ([facility] 10/29/2013    Unspecified sleep apnea     Noncompliant with CPAP      Past Surgical History:   Procedure Laterality Date    DENTAL SURGERY         Family History   Problem Relation Age of Onset    Diabetes Mother     High Blood Pressure Father     Cancer Maternal Grandmother        Social History     Tobacco Use    Smoking status: Never    Smokeless tobacco: Never   Substance Use Topics    Alcohol use: Yes     Alcohol/week: 0.0 standard drinks     Comment: socially      Current Outpatient Medications   Medication Sig Dispense Refill    Semaglutide,0.25 or 0.5MG/DOS, 2 MG/1.5ML SOPN Inject 0.25 mg into the skin once a week 1 Adjustable Dose Pre-filled Pen Syringe 0    sacubitril-valsartan (ENTRESTO) 24-26 MG per tablet Take 1 tablet by mouth in the morning and 1 tablet before bedtime. spironolactone (ALDACTONE) 25 MG tablet Take 25 mg by mouth in the morning. furosemide (LASIX) 40 MG tablet Take 40 mg by mouth in the morning. amLODIPine (NORVASC) 10 MG tablet Take 1 tablet by mouth in the morning. 90 tablet 1    fluticasone (FLONASE) 50 MCG/ACT nasal spray 1 spray by Each Nostril route in the morning. 1 each 1    albuterol sulfate HFA (VENTOLIN HFA) 108 (90 Base) MCG/ACT inhaler Inhale 2 puffs into the lungs 4 times daily as needed for Wheezing 3 Inhaler 0     No current facility-administered medications for this visit.      Allergies   Allergen Reactions    Cephalexin     Pcn [Penicillins]        Health Maintenance   Topic Date Due    Pneumococcal 0-64 years Vaccine (1 - PCV) Never done    HIV screen  Never done    Hepatitis C screen  Never done    Flu vaccine (1) Never done    A1C test (Diabetic or Prediabetic)  07/19/2023    Depression Screen  09/06/2023    Lipids  07/28/2027    DTaP/Tdap/Td vaccine (2 - Td or Tdap) Neurological:      General: No focal deficit present. Mental Status: He is alert and oriented to person, place, and time. Psychiatric:         Mood and Affect: Mood normal.         Behavior: Behavior normal.     /84 (Site: Left Upper Arm, Position: Sitting, Cuff Size: Large Adult)   Pulse 84   Resp 16   Ht 6' (1.829 m)   Wt (!) 378 lb 6.4 oz (171.6 kg)   SpO2 94%   BMI 51.32 kg/m²     Assessment:       ICD-10-CM    1. Nonischemic cardiomyopathy (HCC)  I42.8       2. 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 Morbid obesity with BMI of 50.0-59.9, adult (HCC)  E66.01 Semaglutide,0.25 or 0.5MG/DOS, 2 MG/1.5ML SOPN    Z68.43       4. Prediabetes  R73.03 Semaglutide,0.25 or 0.5MG/DOS, 2 MG/1.5ML SOPN               Plan:      1,2. Patient is doing well status post hospital stay for acute heart failure/nonischemic cardiomyopathy. He is doing well on Lasix, Entresto, Aldactone. I encouraged continue following with cardiology. 3, 4. Patient with significant morbid obesity and prediabetes. We had a long discussion about the importance of weight management. Plan to start Ozempic once weekly. We discussed instructions and side effects. I stressed the importance of monitoring excess snacking, carbohydrates, portion sizes. I also encouraged gradually increasing physical activity as tolerable. 5.  Blood pressure stable. Continue amlodipine 10 mg once daily. Return in about 1 month (around 10/6/2022) for medication recheck. No orders of the defined types were placed in this encounter. Patient given educational materials - see patient instructions. Discussed use, benefit, and side effects of prescribed medications. All patient questions answered. Pt voiced understanding. Reviewed health maintenance. Instructed to continue current medications, diet and exercise. Patient agreed with treatment plan. Follow up as directed.         Electronically signed by Renny Justice PA-C on 9/6/2022 at 3:45 PM

## 2022-09-07 ENCOUNTER — TELEPHONE (OUTPATIENT)
Dept: PRIMARY CARE CLINIC | Age: 42
End: 2022-09-07

## 2022-09-07 NOTE — TELEPHONE ENCOUNTER
PA Not needed for Ozempic per [facility] fax received by the office.

## 2022-10-18 ENCOUNTER — OFFICE VISIT (OUTPATIENT)
Dept: PRIMARY CARE CLINIC | Age: 42
End: 2022-10-18
Payer: COMMERCIAL

## 2022-10-18 VITALS
WEIGHT: 315 LBS | RESPIRATION RATE: 16 BRPM | SYSTOLIC BLOOD PRESSURE: 122 MMHG | HEART RATE: 89 BPM | OXYGEN SATURATION: 94 % | DIASTOLIC BLOOD PRESSURE: 82 MMHG | HEIGHT: 72 IN | BODY MASS INDEX: 42.66 KG/M2

## 2022-10-18 DIAGNOSIS — M25.512 ACUTE PAIN OF LEFT SHOULDER: ICD-10-CM

## 2022-10-18 DIAGNOSIS — E66.01 MORBID OBESITY WITH BMI OF 50.0-59.9, ADULT (HCC): Primary | ICD-10-CM

## 2022-10-18 DIAGNOSIS — R73.03 PREDIABETES: ICD-10-CM

## 2022-10-18 PROCEDURE — 99214 OFFICE O/P EST MOD 30 MIN: CPT | Performed by: PHYSICIAN ASSISTANT

## 2022-10-18 RX ORDER — ACETAMINOPHEN AND CODEINE PHOSPHATE 300; 30 MG/1; MG/1
1 TABLET ORAL EVERY 8 HOURS PRN
Qty: 9 TABLET | Refills: 0 | Status: SHIPPED | OUTPATIENT
Start: 2022-10-18 | End: 2022-10-21

## 2022-10-18 ASSESSMENT — ENCOUNTER SYMPTOMS
VOMITING: 0
ABDOMINAL PAIN: 0
BACK PAIN: 0
CONSTIPATION: 0
SINUS PAIN: 0
SHORTNESS OF BREATH: 0
COUGH: 0
DIARRHEA: 0
NAUSEA: 0
RHINORRHEA: 0
EYE PAIN: 0

## 2022-10-18 ASSESSMENT — PATIENT HEALTH QUESTIONNAIRE - PHQ9
1. LITTLE INTEREST OR PLEASURE IN DOING THINGS: 0
SUM OF ALL RESPONSES TO PHQ QUESTIONS 1-9: 0
SUM OF ALL RESPONSES TO PHQ9 QUESTIONS 1 & 2: 0
SUM OF ALL RESPONSES TO PHQ QUESTIONS 1-9: 0
SUM OF ALL RESPONSES TO PHQ QUESTIONS 1-9: 0
2. FEELING DOWN, DEPRESSED OR HOPELESS: 0
SUM OF ALL RESPONSES TO PHQ QUESTIONS 1-9: 0

## 2022-10-18 NOTE — PROGRESS NOTES
[address] [facility] CARE  [facility] [address] [facility] [address]  [address] [address]  Dept: [phone]  Dept Fax: [phone]    Blanca Deal is a 36 y.o. male who presents today for his medical conditions/complaints as noted below. Chief Complaint   Patient presents with    1 Month Follow-Up     Left  shoulder pain x 1.5 weeks on and off, has used Tylenol with minor relief, increased pain and limites range of motion in the morning       HPI:     Patient presents to the office for 1 month follow-up on Ozempic for management of weight and prediabetes. Patient was able to start Ozempic 0.25 mg weekly. He reports he has completed 4 doses thus far without any issues. Denies any significant GI side effect. He is down 4 pounds since last office visit. He reports medication is going well. Primary concern today is left shoulder pain. This has been ongoing for 2 weeks. He is right-hand dominant. He states about 2 weeks ago he was reaching in his car for his wife's purse and felt a pulling/popping sensation of the left shoulder. Prior to this event his shoulder was slightly tender. Since this incident he has had continued pain over the anterior lateral shoulder. Pain is worse with any kind of physical movement. He has difficulty laying on this left side. The pain will radiate down left shoulder to the elbow region. Denies any numbness or tingling. He has been taking Tylenol with mild benefit. Denies any right-sided symptoms. He does admit to slight pain at the lateral left neck region. He admits to weakness of the left arm secondary to pain. No evaluation. No other concerns or complaints. BP stable.       Hemoglobin A1C (%)   Date Value   2022 6.0   10/16/2013 5.6             ( goal A1C is < 7)   No results found for: LABMICR  LDL Cholesterol (mg/dL)   Date Value   2022 118   10/17/2013 96       (goal LDL is <100)   AST (U/L)   Date Value 07/19/2022 19     ALT (U/L)   Date Value   07/19/2022 27     BUN (mg/dL)   Date Value   07/21/2022 15     BP Readings from Last 3 Encounters:   10/18/22 122/82   09/06/22 124/84   08/05/22 124/88          (goal 120/80)    Past Medical History:   Diagnosis Date    Acute respiratory failure with hypoxia ([facility] 7/6/2020    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 ([facility] 3/18/2022    GERD (gastroesophageal reflux disease)     Headache(784.0)     Hyperlipidemia     Hypertension     Morbid obesity with BMI of 45.0-49.9, adult ([facility]     Patient in clinical research study 07/07/2020    Convalescent plasma study day of completion 7/11/20    Unilateral paresis ([facility] 10/29/2013    Unspecified sleep apnea     Noncompliant with CPAP      Past Surgical History:   Procedure Laterality Date    DENTAL SURGERY         Family History   Problem Relation Age of Onset    Diabetes Mother     High Blood Pressure Father     Cancer Maternal Grandmother        Social History     Tobacco Use    Smoking status: Never    Smokeless tobacco: Never   Substance Use Topics    Alcohol use: Yes     Alcohol/week: 0.0 standard drinks     Comment: socially      Current Outpatient Medications   Medication Sig Dispense Refill    Semaglutide,0.25 or 0.5MG/DOS, 2 MG/1.5ML SOPN Inject 0.5 mg into the skin once a week 1 Adjustable Dose Pre-filled Pen Syringe 0    acetaminophen-codeine (TYLENOL/CODEINE #3) 300-30 MG per tablet Take 1 tablet by mouth every 8 hours as needed for Pain for up to 3 days. Intended supply: 3 days. Take lowest dose possible to manage pain 9 tablet 0    sacubitril-valsartan (ENTRESTO) 24-26 MG per tablet Take 1 tablet by mouth in the morning and 1 tablet before bedtime. spironolactone (ALDACTONE) 25 MG tablet Take 25 mg by mouth in the morning. furosemide (LASIX) 40 MG tablet Take 40 mg by mouth in the morning. amLODIPine (NORVASC) 10 MG tablet Take 1 tablet by mouth in the morning.  90 tablet 1 fluticasone (FLONASE) 50 MCG/ACT nasal spray 1 spray by Each Nostril route in the morning. 1 each 1    albuterol sulfate HFA (VENTOLIN HFA) 108 (90 Base) MCG/ACT inhaler Inhale 2 puffs into the lungs 4 times daily as needed for Wheezing 3 Inhaler 0     No current facility-administered medications for this visit. Allergies   Allergen Reactions    Cephalexin     Pcn [Penicillins]        Health Maintenance   Topic Date Due    Pneumococcal 0-64 years Vaccine (1 - PCV) Never done    HIV screen  Never done    Hepatitis C screen  Never done    Flu vaccine (1) 10/18/2023 (Originally 8/1/2022)    A1C test (Diabetic or Prediabetic)  07/19/2023    Depression Screen  10/18/2023    Lipids  07/28/2027    DTaP/Tdap/Td vaccine (2 - Td or Tdap) 05/01/2031    COVID-19 Vaccine  Completed    Hepatitis A vaccine  Aged Out    Hib vaccine  Aged Out    Meningococcal (ACWY) vaccine  Aged Out       Subjective:      Review of Systems   Constitutional:  Negative for chills, fatigue and fever. HENT:  Negative for congestion, rhinorrhea and sinus pain. Eyes:  Negative for pain. Respiratory:  Negative for cough and shortness of breath. Cardiovascular:  Negative for chest pain and leg swelling. Gastrointestinal:  Negative for abdominal pain, constipation, diarrhea, nausea and vomiting. Genitourinary:  Negative for difficulty urinating, enuresis and testicular pain. Musculoskeletal:  Positive for arthralgias. Negative for back pain and myalgias. Skin:  Negative for rash. Neurological:  Negative for dizziness and headaches. Psychiatric/Behavioral:  Negative for confusion and sleep disturbance. The patient is not nervous/anxious. Objective:     Physical Exam  Vitals and nursing note reviewed. Constitutional:       General: He is not in acute distress. Appearance: Normal appearance. He is obese. HENT:      Head: Normocephalic.       Mouth/Throat:      Mouth: Mucous membranes are moist.   Eyes:      Extraocular Movements: Extraocular movements intact. Conjunctiva/sclera: Conjunctivae normal.      Pupils: Pupils are equal, round, and reactive to light. Cardiovascular:      Rate and Rhythm: Normal rate and regular rhythm. Pulses: Normal pulses. Heart sounds: Normal heart sounds. No murmur heard. Pulmonary:      Effort: Pulmonary effort is normal. No respiratory distress. Breath sounds: Normal breath sounds. Abdominal:      General: Abdomen is flat. Bowel sounds are normal.      Palpations: Abdomen is soft. Tenderness: There is no abdominal tenderness. Musculoskeletal:      Cervical back: Normal range of motion. Right lower leg: No edema. Left lower leg: No edema. Comments: Left shoulder: No erythema, edema, ecchymosis. There is significant limitations with range of motion actively and passively with forward flexion, abduction, external rotation. Physical exam is limited due to pain of the shoulder joint. Left elbow: No erythema, edema, ecchymosis. Full flexion and extension. Full supination and pronation.  strength is 5 out of 5. Neurovascularly intact. Right shoulder: Full active and passive range of motion. Nontender. Lymphadenopathy:      Cervical: No cervical adenopathy. Skin:     General: Skin is warm. Capillary Refill: Capillary refill takes less than 2 seconds. Neurological:      General: No focal deficit present. Mental Status: He is alert and oriented to person, place, and time. Psychiatric:         Mood and Affect: Mood normal.         Behavior: Behavior normal.     /82 (Site: Left Upper Arm, Position: Sitting, Cuff Size: Large Adult)   Pulse 89   Resp 16   Ht 6' (1.829 m)   Wt (!) 374 lb (169.6 kg)   SpO2 94%   BMI 50.72 kg/m²     Assessment:       ICD-10-CM    1. Morbid obesity with BMI of 50.0-59.9, adult (HCC)  E66.01 Semaglutide,0.25 or 0.5MG/DOS, 2 MG/1.5ML SOPN    Z68.43       2.  Prediabetes  R73.03 Semaglutide,0.25 or 0.5MG/DOS, 2 MG/1.5ML SOPN      3. Acute pain of left shoulder  M25.512 XR SHOULDER LEFT (MIN 2 VIEWS)     acetaminophen-codeine (TYLENOL/CODEINE #3) 300-30 MG per tablet               Plan:      1,2. She is responding well to Ozempic management thus far. Plan to increase dose to 0.5 mg weekly. I continue to encourage nutrition changes to promote weight loss. I also recommended increasing physical activity. 3.  Patient with acute pain in his left shoulder. Significant ranges of motion which is suspicious for possible frozen shoulder. Plan to rule out bony abnormality with x-ray. He is given short-term supply of Tylenol 3 to use for breakthrough pain. Otherwise I recommend Tylenol 1000 mg.  I advised against steroids and NSAIDs with known heart issues. Pending imaging will refer to Ortho. Return in about 4 weeks (around 11/15/2022) for medication recheck, blood pressure, weight recheck. Orders Placed This Encounter   Procedures    XR SHOULDER LEFT (MIN 2 VIEWS)     Standing Status:   Future     Standing Expiration Date:   10/18/2023     Order Specific Question:   Reason for exam:     Answer:   acute left shoulder pain with limited ROM         Patient given educational materials - see patient instructions. Discussed use, benefit, and side effects of prescribedmedications. All patient questions answered. Pt voiced understanding. Reviewed health maintenance. Instructed to continue current medications, diet and exercise. Patient agreed with treatment plan. Follow up as directed.         Electronically signed by Mary Ellen Luo PA-C on 10/18/2022 at 2:23 PM

## 2022-11-01 ENCOUNTER — OFFICE VISIT (OUTPATIENT)
Dept: FAMILY MEDICINE CLINIC | Age: 42
End: 2022-11-01
Payer: COMMERCIAL

## 2022-11-01 VITALS
BODY MASS INDEX: 42.66 KG/M2 | DIASTOLIC BLOOD PRESSURE: 82 MMHG | HEIGHT: 72 IN | OXYGEN SATURATION: 95 % | HEART RATE: 68 BPM | WEIGHT: 315 LBS | TEMPERATURE: 98.3 F | SYSTOLIC BLOOD PRESSURE: 125 MMHG

## 2022-11-01 DIAGNOSIS — H60.332 ACUTE SWIMMER'S EAR OF LEFT SIDE: ICD-10-CM

## 2022-11-01 DIAGNOSIS — H66.002 NON-RECURRENT ACUTE SUPPURATIVE OTITIS MEDIA OF LEFT EAR WITHOUT SPONTANEOUS RUPTURE OF TYMPANIC MEMBRANE: Primary | ICD-10-CM

## 2022-11-01 PROCEDURE — 3074F SYST BP LT 130 MM HG: CPT | Performed by: REGISTERED NURSE

## 2022-11-01 PROCEDURE — 99213 OFFICE O/P EST LOW 20 MIN: CPT | Performed by: REGISTERED NURSE

## 2022-11-01 PROCEDURE — 3078F DIAST BP <80 MM HG: CPT | Performed by: REGISTERED NURSE

## 2022-11-01 RX ORDER — DOXYCYCLINE 100 MG/1
100 TABLET ORAL 2 TIMES DAILY
Qty: 20 TABLET | Refills: 0 | Status: SHIPPED | OUTPATIENT
Start: 2022-11-01 | End: 2022-11-11

## 2022-11-01 RX ORDER — OFLOXACIN 3 MG/ML
10 SOLUTION AURICULAR (OTIC) DAILY
Qty: 3.5 ML | Refills: 0 | Status: SHIPPED | OUTPATIENT
Start: 2022-11-01 | End: 2022-11-08

## 2022-11-01 ASSESSMENT — ENCOUNTER SYMPTOMS
WHEEZING: 0
VOICE CHANGE: 0
GASTROINTESTINAL NEGATIVE: 1
SORE THROAT: 0
SINUS PAIN: 0
SINUS PRESSURE: 1
SHORTNESS OF BREATH: 0
TROUBLE SWALLOWING: 0
COUGH: 0
EYES NEGATIVE: 1

## 2022-11-01 NOTE — PROGRESS NOTES
[address] [facility] WALK-IN  [address]  [address] [address]  Dept: [phone]  Dept Fax: [phone]    Adrian Morocho is a 36 y.o. male who presents today for his medical conditions/complaints of   Chief Complaint   Patient presents with    Otalgia     C/o left ear pain ~ 1 week ago, getting worse-came on with a head ache, a lot of facial pain on left and pressure     Headache          HPI:     /82   Pulse 68   Temp 98.3 °F (36.8 °C) (Temporal)   Ht 6' (1.829 m)   Wt (!) 368 lb (166.9 kg)   SpO2 95%   BMI 49.91 kg/m²       Otalgia   There is pain in the left ear. This is a new problem. Episode onset: x 1 week. The problem occurs constantly. The problem has been gradually worsening. There has been no fever. The pain is at a severity of 10/10. Associated symptoms include headaches. Pertinent negatives include no coughing, ear discharge, hearing loss or sore throat. He has tried acetaminophen for the symptoms. The treatment provided mild relief. There is no history of a chronic ear infection, hearing loss or a tympanostomy tube.      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 ([facility] 3/18/2022    GERD (gastroesophageal reflux disease)     Headache(784.0)     Hyperlipidemia     Hypertension     Morbid obesity with BMI of 45.0-49.9, adult ([facility]     Patient in clinical research study 07/07/2020    Convalescent plasma study day of completion 7/11/20    Unilateral paresis ([address] 10/29/2013    Unspecified sleep apnea     Noncompliant with CPAP        Past Surgical History:   Procedure Laterality Date    DENTAL SURGERY         Family History   Problem Relation Age of Onset    Diabetes Mother     High Blood Pressure Father     Cancer Maternal Grandmother        Social History     Tobacco Use    Smoking status: Never    Smokeless tobacco: Never   Substance Use Topics    Alcohol use: Yes     Alcohol/week: 0.0 standard drinks     Comment: socially        Prior to Visit Medications    Medication Sig Taking? Authorizing Provider   ofloxacin (FLOXIN) 0.3 % otic solution Place 10 drops into the left ear daily for 7 days Yes TORIBIO Poole CNP   doxycycline monohydrate (ADOXA) 100 MG tablet Take 1 tablet by mouth 2 times daily for 10 days Yes TORIBIO Poole CNP   Semaglutide,0.25 or 0.5MG/DOS, 2 MG/1.5ML SOPN Inject 0.5 mg into the skin once a week  Jaya Leigh PA-C   sacubitril-valsartan (ENTRESTO) 24-26 MG per tablet Take 1 tablet by mouth in the morning and 1 tablet before bedtime. Historical Provider, MD   spironolactone (ALDACTONE) 25 MG tablet Take 25 mg by mouth in the morning. Historical Provider, MD   furosemide (LASIX) 40 MG tablet Take 40 mg by mouth in the morning. Historical Provider, MD   amLODIPine (NORVASC) 10 MG tablet Take 1 tablet by mouth in the morning. Jaya Leigh PA-C   fluticasone (FLONASE) 50 MCG/ACT nasal spray 1 spray by Each Nostril route in the morning. Jaya Leigh PA-C   albuterol sulfate HFA (VENTOLIN HFA) 108 (90 Base) MCG/ACT inhaler Inhale 2 puffs into the lungs 4 times daily as needed for Norma Judge MD       Allergies   Allergen Reactions    Cephalexin     Pcn [Penicillins]          Subjective:      Review of Systems   Constitutional: Negative. Negative for chills, diaphoresis and fever. HENT:  Positive for ear pain and sinus pressure. Negative for ear discharge, hearing loss, sinus pain, sore throat, trouble swallowing and voice change. Eyes: Negative. Respiratory:  Negative for cough, shortness of breath and wheezing. Cardiovascular: Negative. Negative for chest pain. Gastrointestinal: Negative. Genitourinary: Negative. Musculoskeletal: Negative. Neurological:  Positive for headaches.  Negative for facial asymmetry and speech difficulty. Psychiatric/Behavioral: Negative. Objective:     Physical Exam  Constitutional:       General: He is not in acute distress. Appearance: He is obese. He is not ill-appearing or toxic-appearing. HENT:      Head: Normocephalic. Right Ear: Tympanic membrane, ear canal and external ear normal. No decreased hearing noted. Tympanic membrane is not perforated or erythematous. Left Ear: External ear normal. No decreased hearing noted. Swelling and tenderness present. Tympanic membrane is erythematous and bulging. Tympanic membrane is not perforated. Nose:      Right Sinus: No maxillary sinus tenderness or frontal sinus tenderness. Left Sinus: Maxillary sinus tenderness and frontal sinus tenderness present. Mouth/Throat:      Mouth: Mucous membranes are moist.      Pharynx: Oropharynx is clear. No oropharyngeal exudate or posterior oropharyngeal erythema. Eyes:      General:         Right eye: No discharge. Left eye: No discharge. Conjunctiva/sclera: Conjunctivae normal.   Cardiovascular:      Rate and Rhythm: Normal rate and regular rhythm. Heart sounds: Normal heart sounds. Pulmonary:      Effort: Pulmonary effort is normal. No respiratory distress. Breath sounds: Normal breath sounds. No stridor. No wheezing, rhonchi or rales. Chest:      Chest wall: No tenderness. Skin:     General: Skin is warm. Neurological:      General: No focal deficit present. Mental Status: He is alert. Psychiatric:         Mood and Affect: Mood normal.         MEDICAL DECISION MAKING Assessment/Plan:     Sorin was seen today for otalgia and headache. Diagnoses and all orders for this visit:    Non-recurrent acute suppurative otitis media of left ear without spontaneous rupture of tympanic membrane  -     doxycycline monohydrate (ADOXA) 100 MG tablet;  Take 1 tablet by mouth 2 times daily for 10 days    Acute swimmer's ear of left side  -     ofloxacin (FLOXIN) 0.3 % otic solution; Place 10 drops into the left ear daily for 7 days    Based on patient's history and exam findings, I will treat this as an otitis media and otitis externa of the left ear. Oral doxycycline and Ofloxacin gtts prescribed today. Patient instructed to complete antibiotic prescription fully. Increase fluids. May use Motrin/Tylenol for fever/pain as directed on the bottle. Warm compresses as desired for ear pain. Advised he keep his ear dry and refrain from swimming while undergoing treatment. Follow up if symptoms do not improve. Go to the ER for any emergent concern.      Results for orders placed or performed during the hospital encounter of 07/21/22   CBC with Auto Differential   Result Value Ref Range    WBC 7.5 3.5 - 11.3 k/uL    RBC 5.37 4.21 - 5.77 m/uL    Hemoglobin 15.0 13.0 - 17.0 g/dL    Hematocrit 48.4 40.7 - 50.3 %    MCV 90.1 82.6 - 102.9 fL    MCH 27.9 25.2 - 33.5 pg    MCHC 31.0 28.4 - 34.8 g/dL    RDW 13.9 11.8 - 14.4 %    Platelets 432 794 - 888 k/uL    MPV 10.2 8.1 - 13.5 fL    NRBC Automated 0.0 0.0 per 100 WBC    Seg Neutrophils 63 36 - 65 %    Lymphocytes 28 24 - 43 %    Monocytes 6 3 - 12 %    Eosinophils % 2 1 - 4 %    Basophils 1 0 - 2 %    Immature Granulocytes 0 0 %    Segs Absolute 4.79 1.50 - 8.10 k/uL    Absolute Lymph # 2.08 1.10 - 3.70 k/uL    Absolute Mono # 0.43 0.10 - 1.20 k/uL    Absolute Eos # 0.14 0.00 - 0.44 k/uL    Basophils Absolute 0.04 0.00 - 0.20 k/uL    Absolute Immature Granulocyte 0.03 0.00 - 0.30 k/uL   Basic Metabolic Panel   Result Value Ref Range    Glucose 111 (H) 70 - 99 mg/dL    BUN 15 6 - 20 mg/dL    Creatinine 1.14 0.70 - 1.20 mg/dL    Bun/Cre Ratio 13 9 - 20    Calcium 8.9 8.6 - 10.4 mg/dL    Sodium 141 135 - 144 mmol/L    Potassium 3.5 (L) 3.7 - 5.3 mmol/L    Chloride 101 98 - 107 mmol/L    CO2 31 20 - 31 mmol/L    Anion Gap 9 9 - 17 mmol/L    GFR Non-African American >60 >60 mL/min    GFR  >60 >60 mL/min    GFR Comment         TROP/MYOGLOBIN   Result Value Ref Range    Troponin, High Sensitivity 22 0 - 22 ng/L    Myoglobin 57 28 - 72 ng/mL   EKG 12 Lead   Result Value Ref Range    Ventricular Rate 88 BPM    Atrial Rate 88 BPM    P-R Interval 162 ms    QRS Duration 114 ms    Q-T Interval 434 ms    QTc Calculation (Bazett) 525 ms    P Axis 22 degrees    R Axis -35 degrees    T Axis 79 degrees       Patient counseled:     Patient given educational materials - see patientinstructions. Discussed use, benefit, and side effects of prescribed medications. All patient questions answered. Pt verbalized understanding. Instructed to continue current medications, diet and exercise. Patient agreed with treatment plan. Follow up as directed.      Electronically signed by TORIBIO Bryan CNP on 11/1/2022 at 3:42 PM

## 2022-12-02 ENCOUNTER — OFFICE VISIT (OUTPATIENT)
Dept: PRIMARY CARE CLINIC | Age: 42
End: 2022-12-02
Payer: COMMERCIAL

## 2022-12-02 VITALS
SYSTOLIC BLOOD PRESSURE: 124 MMHG | HEART RATE: 82 BPM | WEIGHT: 315 LBS | OXYGEN SATURATION: 98 % | DIASTOLIC BLOOD PRESSURE: 82 MMHG | RESPIRATION RATE: 16 BRPM | BODY MASS INDEX: 51.13 KG/M2

## 2022-12-02 DIAGNOSIS — L73.8 FOLLICULITIS BARBAE: ICD-10-CM

## 2022-12-02 DIAGNOSIS — I10 PRIMARY HYPERTENSION: ICD-10-CM

## 2022-12-02 DIAGNOSIS — I42.8 NONISCHEMIC CARDIOMYOPATHY (HCC): ICD-10-CM

## 2022-12-02 DIAGNOSIS — J06.9 UPPER RESPIRATORY TRACT INFECTION, UNSPECIFIED TYPE: ICD-10-CM

## 2022-12-02 DIAGNOSIS — E66.01 MORBID OBESITY WITH BMI OF 50.0-59.9, ADULT (HCC): Primary | ICD-10-CM

## 2022-12-02 PROCEDURE — 99214 OFFICE O/P EST MOD 30 MIN: CPT | Performed by: PHYSICIAN ASSISTANT

## 2022-12-02 PROCEDURE — 3074F SYST BP LT 130 MM HG: CPT | Performed by: PHYSICIAN ASSISTANT

## 2022-12-02 PROCEDURE — 3078F DIAST BP <80 MM HG: CPT | Performed by: PHYSICIAN ASSISTANT

## 2022-12-02 RX ORDER — CEPHALEXIN 250 MG/1
250 CAPSULE ORAL DAILY
COMMUNITY
End: 2022-12-02

## 2022-12-02 RX ORDER — CEPHALEXIN 250 MG/1
250 CAPSULE ORAL DAILY
Qty: 30 CAPSULE | Refills: 0 | Status: CANCELLED | OUTPATIENT
Start: 2022-12-02

## 2022-12-02 RX ORDER — CEPHALEXIN 500 MG/1
500 CAPSULE ORAL 3 TIMES DAILY
Qty: 21 CAPSULE | Refills: 0 | Status: SHIPPED | OUTPATIENT
Start: 2022-12-02 | End: 2022-12-09

## 2022-12-02 ASSESSMENT — PATIENT HEALTH QUESTIONNAIRE - PHQ9
SUM OF ALL RESPONSES TO PHQ QUESTIONS 1-9: 0
SUM OF ALL RESPONSES TO PHQ9 QUESTIONS 1 & 2: 0
1. LITTLE INTEREST OR PLEASURE IN DOING THINGS: 0
SUM OF ALL RESPONSES TO PHQ QUESTIONS 1-9: 0
SUM OF ALL RESPONSES TO PHQ QUESTIONS 1-9: 0
2. FEELING DOWN, DEPRESSED OR HOPELESS: 0
SUM OF ALL RESPONSES TO PHQ QUESTIONS 1-9: 0

## 2022-12-02 ASSESSMENT — ENCOUNTER SYMPTOMS
BACK PAIN: 0
SINUS PAIN: 1
CONSTIPATION: 0
COUGH: 1
ABDOMINAL PAIN: 0
NAUSEA: 0
EYE PAIN: 0
SINUS PRESSURE: 1
DIARRHEA: 0
SORE THROAT: 0
SHORTNESS OF BREATH: 0
VOMITING: 0

## 2022-12-02 NOTE — PROGRESS NOTES
[facility] [address]  Dept: [phone]  Dept Fax: [phone]    Scott Oliveira is a 36 y.o. male who presents today for his medical conditions/complaints as noted below. Chief Complaint   Patient presents with    Hypertension    Medication Check    Cough     Productive yellow/green     Congestion       HPI:     Patient presents the office for recheck of hypertension and weight. He has recently increased Ozempic to 0.5 mg weekly. Despite this change in medication, patient has not seen significant weight loss. He is struggling with weight even with making dietary changes. Blood pressure stable. He is following closely with cardiology with known heart failure and hypertension. He has an echo next month. They did recently increase Entresto and metoprolol. Today, patient does have sinus concern. He describes increasing sinus pain and pressure with postnasal drainage. He describes green mucus production. Intermittent cough. No fevers or chills. No body aches. No shortness of breath or chest pain. No abdominal concern. He has taken at home COVID test which was negative. Symptoms have been ongoing for 7 days. Patient also concern for flareup of cyst/folliculitis on face and head. In the past he was told he has cystic acne. He has been treated with Keflex in the past.  No current dermatologist.  No drainage from lesions. No other complaints or concerns. BP stable. Weight slightly increased from last visit. Hypertension  This is a chronic problem. The current episode started more than 1 year ago. The problem is unchanged. Pertinent negatives include no chest pain, headaches or shortness of breath. Risk factors for coronary artery disease include family history, obesity, male gender, dyslipidemia and sedentary lifestyle. Past treatments include diuretics, calcium channel blockers and angiotensin blockers. The current treatment provides moderate improvement. Compliance problems include diet and exercise. Hypertensive end-organ damage includes heart failure and left ventricular hypertrophy. Hemoglobin A1C (%)   Date Value   07/19/2022 6.0   10/16/2013 5.6             ( goal A1C is < 7)   No results found for: LABMICR  LDL Cholesterol (mg/dL)   Date Value   07/19/2022 118   10/17/2013 96       (goal LDL is <100)   AST (U/L)   Date Value   07/19/2022 19     ALT (U/L)   Date Value   07/19/2022 27     BUN (mg/dL)   Date Value   07/21/2022 15     BP Readings from Last 3 Encounters:   12/02/22 124/82   11/01/22 125/82   10/18/22 122/82          (goal 120/80)    Past Medical History:   Diagnosis Date    Acute respiratory failure with hypoxia ([facility] 7/6/2020    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 ([facility] 3/18/2022    GERD (gastroesophageal reflux disease)     Headache(784.0)     Hyperlipidemia     Hypertension     Morbid obesity with BMI of 45.0-49.9, adult ([facility]     Patient in clinical research study 07/07/2020    Convalescent plasma study day of completion 7/11/20    Unilateral paresis ([facility] 10/29/2013    Unspecified sleep apnea     Noncompliant with CPAP      Past Surgical History:   Procedure Laterality Date    DENTAL SURGERY         Family History   Problem Relation Age of Onset    Diabetes Mother     High Blood Pressure Father     Cancer Maternal Grandmother        Social History     Tobacco Use    Smoking status: Never    Smokeless tobacco: Never   Substance Use Topics    Alcohol use:  Yes     Alcohol/week: 0.0 standard drinks     Comment: socially      Current Outpatient Medications   Medication Sig Dispense Refill    cephALEXin (KEFLEX) 500 MG capsule Take 1 capsule by mouth 3 times daily for 7 days 21 capsule 0    Semaglutide,0.25 or 0.5MG/DOS, 2 MG/1.5ML SOPN Inject 0.5 mg into the skin once a week 1 Adjustable Dose Pre-filled Pen Syringe 0    sacubitril-valsartan (ENTRESTO) 24-26 MG per tablet Take 1 tablet by mouth in the morning and 1 tablet before bedtime. spironolactone (ALDACTONE) 25 MG tablet Take 25 mg by mouth in the morning. furosemide (LASIX) 40 MG tablet Take 40 mg by mouth in the morning. amLODIPine (NORVASC) 10 MG tablet Take 1 tablet by mouth in the morning. 90 tablet 1    fluticasone (FLONASE) 50 MCG/ACT nasal spray 1 spray by Each Nostril route in the morning. 1 each 1    albuterol sulfate HFA (VENTOLIN HFA) 108 (90 Base) MCG/ACT inhaler Inhale 2 puffs into the lungs 4 times daily as needed for Wheezing 3 Inhaler 0     No current facility-administered medications for this visit. Allergies   Allergen Reactions    Pcn [Penicillins]        Health Maintenance   Topic Date Due    Pneumococcal 0-64 years Vaccine (1 - PCV) Never done    HIV screen  Never done    Hepatitis C screen  Never done    COVID-19 Vaccine (5 - Booster for Moderna series) 12/07/2021    Flu vaccine (1) 10/18/2023 (Originally 8/1/2022)    A1C test (Diabetic or Prediabetic)  07/19/2023    Depression Screen  10/18/2023    Lipids  07/28/2027    DTaP/Tdap/Td vaccine (2 - Td or Tdap) 05/01/2031    Hepatitis A vaccine  Aged Out    Hib vaccine  Aged Out    Meningococcal (ACWY) vaccine  Aged Out       Subjective:      Review of Systems   Constitutional:  Negative for chills, fatigue and fever. HENT:  Positive for congestion, postnasal drip, sinus pressure and sinus pain. Negative for ear pain and sore throat. Eyes:  Negative for pain. Respiratory:  Positive for cough. Negative for shortness of breath. Cardiovascular:  Negative for chest pain and leg swelling. Gastrointestinal:  Negative for abdominal pain, constipation, diarrhea, nausea and vomiting. Genitourinary:  Negative for difficulty urinating, enuresis and testicular pain. Musculoskeletal:  Negative for arthralgias, back pain and myalgias.    Skin:  Negative for rash.        + acne/cysts   Neurological:  Negative for dizziness and headaches. Psychiatric/Behavioral:  Negative for confusion and sleep disturbance. The patient is not nervous/anxious. All other systems reviewed and are negative. Objective:     Physical Exam  Vitals and nursing note reviewed. Constitutional:       General: He is not in acute distress. Appearance: Normal appearance. He is obese. HENT:      Head: Normocephalic. Right Ear: Tympanic membrane, ear canal and external ear normal.      Left Ear: Tympanic membrane, ear canal and external ear normal.      Nose: Congestion present. Mouth/Throat:      Mouth: Mucous membranes are moist.      Pharynx: No oropharyngeal exudate or posterior oropharyngeal erythema. Eyes:      Extraocular Movements: Extraocular movements intact. Conjunctiva/sclera: Conjunctivae normal.      Pupils: Pupils are equal, round, and reactive to light. Cardiovascular:      Rate and Rhythm: Normal rate and regular rhythm. Pulses: Normal pulses. Heart sounds: Normal heart sounds. Pulmonary:      Effort: Pulmonary effort is normal. No respiratory distress. Breath sounds: Normal breath sounds. Musculoskeletal:      Cervical back: Normal range of motion. Right lower leg: No edema. Left lower leg: No edema. Lymphadenopathy:      Cervical: No cervical adenopathy. Skin:     General: Skin is warm. Capillary Refill: Capillary refill takes less than 2 seconds. Findings: Acne and rash present. Rash is nodular. Comments: Within the facial beard/scalp there are cystic/nodule acne lesions. Slight tenderness to palpation. Neurological:      General: No focal deficit present. Mental Status: He is alert and oriented to person, place, and time. Psychiatric:         Mood and Affect: Mood normal.         Behavior: Behavior normal.     /82   Pulse 82   Resp 16   Wt (!) 377 lb (171 kg)   SpO2 98%   BMI 51.13 kg/m²     Assessment:       ICD-10-CM    1.  Morbid obesity with BMI of 50.0-59.9, adult [facility])  [address] - Javier Pitts DO, General Surgery, [place]    G25.33       2. Upper respiratory tract infection, unspecified type  J06.9 cephALEXin (KEFLEX) 500 MG capsule      3. Folliculitis barbae  I47.0 cephALEXin (KEFLEX) 500 MG capsule      4. Nonischemic cardiomyopathy (HCC)  I42.8       5. Primary hypertension  I10                Plan:      1. We had extensive discussion about management of obesity. For now he is to continue Ozempic 0.5 mg once weekly. I strongly encouraged reduction of carbohydrates, large portions, late night snacking. He is interested in referral to Dr. Beto Lam for bariatric management. 2.  Patient with URI. I recommend over-the-counter medicine such as Flonase, Zyrtec, Mucinex as needed. He is given prescription of Keflex with instructions and side effects. 3.  We discussed Keflex as well for folliculitis barbae/cystic acne. If recurrence will refer to dermatology. 4.  Blood pressure stable. Continue amlodipine, spironolactone. 5.  Patient to continue following with cardiology for management of cardiomyopathy. Continue Entresto, Lasix, spironolactone, metoprolol. Return in about 3 months (around 3/2/2023) for medication recheck, blood pressure. Orders Placed This Encounter   Procedures    Beverly MORATAYA DO, General Surgery, [place]     Referral Priority:   Routine     Referral Type:   Eval and Treat     Referral Reason:   Specialty Services Required     Requested Specialty:   General Surgery     Number of Visits Requested:   1         Patient given educational materials - see patient instructions. Discussed use, benefit, and side effects of prescribedmedications. All patient questions answered. Pt voiced understanding. Reviewed health maintenance. Instructed to continue current medications, diet and exercise. Patient agreed with treatment plan. Follow up as directed.         Electronically signed by Brandon Nieves VICTORIA on 12/2/2022 at 2:38 PM

## 2022-12-29 DIAGNOSIS — J06.9 UPPER RESPIRATORY TRACT INFECTION, UNSPECIFIED TYPE: ICD-10-CM

## 2022-12-29 DIAGNOSIS — E66.01 MORBID OBESITY WITH BMI OF 50.0-59.9, ADULT (HCC): ICD-10-CM

## 2022-12-29 DIAGNOSIS — R73.03 PREDIABETES: ICD-10-CM

## 2022-12-29 DIAGNOSIS — L73.8 FOLLICULITIS BARBAE: ICD-10-CM

## 2022-12-29 RX ORDER — SEMAGLUTIDE 1.34 MG/ML
INJECTION, SOLUTION SUBCUTANEOUS
Qty: 1.5 ML | Refills: 0 | Status: SHIPPED | OUTPATIENT
Start: 2022-12-29 | End: 2022-12-30

## 2022-12-29 RX ORDER — CEPHALEXIN 500 MG/1
500 CAPSULE ORAL 3 TIMES DAILY
Qty: 21 CAPSULE | Refills: 0 | OUTPATIENT
Start: 2022-12-29 | End: 2023-01-05

## 2022-12-29 NOTE — TELEPHONE ENCOUNTER
Last Visit Date: 12/2/2022   Next Visit Date: 3/10/2023     States takes the Keflex for a skin condition, goes off for a while then back on.

## 2022-12-30 RX ORDER — SEMAGLUTIDE 1.34 MG/ML
INJECTION, SOLUTION SUBCUTANEOUS
Qty: 1.5 ML | Refills: 2 | Status: SHIPPED | OUTPATIENT
Start: 2022-12-30

## 2023-06-28 ENCOUNTER — TELEPHONE (OUTPATIENT)
Dept: PRIMARY CARE CLINIC | Age: 43
End: 2023-06-28

## 2023-06-28 ENCOUNTER — OFFICE VISIT (OUTPATIENT)
Dept: PRIMARY CARE CLINIC | Age: 43
End: 2023-06-28
Payer: COMMERCIAL

## 2023-06-28 VITALS
DIASTOLIC BLOOD PRESSURE: 92 MMHG | HEIGHT: 72 IN | WEIGHT: 315 LBS | BODY MASS INDEX: 42.66 KG/M2 | RESPIRATION RATE: 16 BRPM | OXYGEN SATURATION: 92 % | HEART RATE: 93 BPM | SYSTOLIC BLOOD PRESSURE: 136 MMHG

## 2023-06-28 DIAGNOSIS — R73.03 PREDIABETES: ICD-10-CM

## 2023-06-28 DIAGNOSIS — I10 PRIMARY HYPERTENSION: Primary | ICD-10-CM

## 2023-06-28 DIAGNOSIS — I42.8 NONISCHEMIC CARDIOMYOPATHY (HCC): ICD-10-CM

## 2023-06-28 DIAGNOSIS — E66.01 MORBID OBESITY WITH BMI OF 50.0-59.9, ADULT (HCC): ICD-10-CM

## 2023-06-28 DIAGNOSIS — E55.9 VITAMIN D DEFICIENCY: ICD-10-CM

## 2023-06-28 DIAGNOSIS — I50.22 CHRONIC SYSTOLIC HEART FAILURE (HCC): ICD-10-CM

## 2023-06-28 PROBLEM — I25.110 ATHSCL HEART DISEASE OF NATIVE COR ART W UNSTABLE ANG PCTRS (HCC): Status: RESOLVED | Noted: 2022-03-18 | Resolved: 2023-06-28

## 2023-06-28 PROCEDURE — G8417 CALC BMI ABV UP PARAM F/U: HCPCS | Performed by: PHYSICIAN ASSISTANT

## 2023-06-28 PROCEDURE — G8427 DOCREV CUR MEDS BY ELIG CLIN: HCPCS | Performed by: PHYSICIAN ASSISTANT

## 2023-06-28 PROCEDURE — 1036F TOBACCO NON-USER: CPT | Performed by: PHYSICIAN ASSISTANT

## 2023-06-28 PROCEDURE — 3080F DIAST BP >= 90 MM HG: CPT | Performed by: PHYSICIAN ASSISTANT

## 2023-06-28 PROCEDURE — 99214 OFFICE O/P EST MOD 30 MIN: CPT | Performed by: PHYSICIAN ASSISTANT

## 2023-06-28 PROCEDURE — 3075F SYST BP GE 130 - 139MM HG: CPT | Performed by: PHYSICIAN ASSISTANT

## 2023-06-28 RX ORDER — METOPROLOL TARTRATE 100 MG/1
100 TABLET ORAL 2 TIMES DAILY
COMMUNITY

## 2023-06-28 SDOH — ECONOMIC STABILITY: FOOD INSECURITY: WITHIN THE PAST 12 MONTHS, THE FOOD YOU BOUGHT JUST DIDN'T LAST AND YOU DIDN'T HAVE MONEY TO GET MORE.: NEVER TRUE

## 2023-06-28 SDOH — ECONOMIC STABILITY: HOUSING INSECURITY
IN THE LAST 12 MONTHS, WAS THERE A TIME WHEN YOU DID NOT HAVE A STEADY PLACE TO SLEEP OR SLEPT IN A SHELTER (INCLUDING NOW)?: NO

## 2023-06-28 SDOH — ECONOMIC STABILITY: FOOD INSECURITY: WITHIN THE PAST 12 MONTHS, YOU WORRIED THAT YOUR FOOD WOULD RUN OUT BEFORE YOU GOT MONEY TO BUY MORE.: NEVER TRUE

## 2023-06-28 SDOH — ECONOMIC STABILITY: INCOME INSECURITY: HOW HARD IS IT FOR YOU TO PAY FOR THE VERY BASICS LIKE FOOD, HOUSING, MEDICAL CARE, AND HEATING?: NOT HARD AT ALL

## 2023-06-28 ASSESSMENT — PATIENT HEALTH QUESTIONNAIRE - PHQ9
SUM OF ALL RESPONSES TO PHQ QUESTIONS 1-9: 0
2. FEELING DOWN, DEPRESSED OR HOPELESS: 0
1. LITTLE INTEREST OR PLEASURE IN DOING THINGS: 0
SUM OF ALL RESPONSES TO PHQ QUESTIONS 1-9: 0
SUM OF ALL RESPONSES TO PHQ QUESTIONS 1-9: 0
SUM OF ALL RESPONSES TO PHQ9 QUESTIONS 1 & 2: 0
SUM OF ALL RESPONSES TO PHQ QUESTIONS 1-9: 0

## 2023-06-28 ASSESSMENT — ENCOUNTER SYMPTOMS
ABDOMINAL PAIN: 0
VOMITING: 0
SHORTNESS OF BREATH: 0
DIARRHEA: 0
EYE PAIN: 0
BACK PAIN: 0
CONSTIPATION: 0
NAUSEA: 0
SINUS PAIN: 0
RHINORRHEA: 0
COUGH: 0

## 2023-08-09 ENCOUNTER — OFFICE VISIT (OUTPATIENT)
Dept: PRIMARY CARE CLINIC | Age: 43
End: 2023-08-09
Payer: COMMERCIAL

## 2023-08-09 VITALS
OXYGEN SATURATION: 93 % | DIASTOLIC BLOOD PRESSURE: 88 MMHG | WEIGHT: 315 LBS | SYSTOLIC BLOOD PRESSURE: 136 MMHG | HEIGHT: 72 IN | BODY MASS INDEX: 42.66 KG/M2 | HEART RATE: 86 BPM | RESPIRATION RATE: 16 BRPM

## 2023-08-09 DIAGNOSIS — E66.01 MORBID OBESITY WITH BMI OF 50.0-59.9, ADULT (HCC): ICD-10-CM

## 2023-08-09 DIAGNOSIS — I50.22 CHRONIC SYSTOLIC HEART FAILURE (HCC): ICD-10-CM

## 2023-08-09 DIAGNOSIS — R73.03 PREDIABETES: ICD-10-CM

## 2023-08-09 DIAGNOSIS — I10 PRIMARY HYPERTENSION: ICD-10-CM

## 2023-08-09 DIAGNOSIS — I42.8 NONISCHEMIC CARDIOMYOPATHY (HCC): Primary | ICD-10-CM

## 2023-08-09 PROCEDURE — 99214 OFFICE O/P EST MOD 30 MIN: CPT | Performed by: PHYSICIAN ASSISTANT

## 2023-08-09 PROCEDURE — 3075F SYST BP GE 130 - 139MM HG: CPT | Performed by: PHYSICIAN ASSISTANT

## 2023-08-09 PROCEDURE — 3079F DIAST BP 80-89 MM HG: CPT | Performed by: PHYSICIAN ASSISTANT

## 2023-08-09 PROCEDURE — G8427 DOCREV CUR MEDS BY ELIG CLIN: HCPCS | Performed by: PHYSICIAN ASSISTANT

## 2023-08-09 PROCEDURE — 1036F TOBACCO NON-USER: CPT | Performed by: PHYSICIAN ASSISTANT

## 2023-08-09 PROCEDURE — G8417 CALC BMI ABV UP PARAM F/U: HCPCS | Performed by: PHYSICIAN ASSISTANT

## 2023-08-09 SDOH — ECONOMIC STABILITY: FOOD INSECURITY: WITHIN THE PAST 12 MONTHS, THE FOOD YOU BOUGHT JUST DIDN'T LAST AND YOU DIDN'T HAVE MONEY TO GET MORE.: NEVER TRUE

## 2023-08-09 SDOH — ECONOMIC STABILITY: INCOME INSECURITY: HOW HARD IS IT FOR YOU TO PAY FOR THE VERY BASICS LIKE FOOD, HOUSING, MEDICAL CARE, AND HEATING?: NOT HARD AT ALL

## 2023-08-09 SDOH — ECONOMIC STABILITY: FOOD INSECURITY: WITHIN THE PAST 12 MONTHS, YOU WORRIED THAT YOUR FOOD WOULD RUN OUT BEFORE YOU GOT MONEY TO BUY MORE.: NEVER TRUE

## 2023-08-09 ASSESSMENT — ENCOUNTER SYMPTOMS
EYE PAIN: 0
CONSTIPATION: 0
SINUS PAIN: 0
BACK PAIN: 0
ABDOMINAL PAIN: 0
DIARRHEA: 0
SHORTNESS OF BREATH: 0
NAUSEA: 0
RHINORRHEA: 0
COUGH: 0
VOMITING: 0

## 2023-08-09 ASSESSMENT — PATIENT HEALTH QUESTIONNAIRE - PHQ9
2. FEELING DOWN, DEPRESSED OR HOPELESS: 0
SUM OF ALL RESPONSES TO PHQ QUESTIONS 1-9: 0
SUM OF ALL RESPONSES TO PHQ QUESTIONS 1-9: 0
SUM OF ALL RESPONSES TO PHQ9 QUESTIONS 1 & 2: 0
SUM OF ALL RESPONSES TO PHQ QUESTIONS 1-9: 0
1. LITTLE INTEREST OR PLEASURE IN DOING THINGS: 0
SUM OF ALL RESPONSES TO PHQ QUESTIONS 1-9: 0

## 2023-08-09 NOTE — PROGRESS NOTES
[address] PRIMARY CARE  [place] [id number] [address] [address]  Dept: [phone]  Dept Fax: [phone]    Burton Lewis is a 39 y.o. male who presents today for his medical conditions/complaints as noted below. Chief Complaint   Patient presents with    Hypertension       HPI:     Patient presents to the office for routine follow-up. His past medical history including hypertension, prediabetes, obesity, CHF, cardiomyopathy, JOE. Today, patient reports he is doing fairly well without any new or acute concerns. He is tolerating current medication regimen without side effects. Denies any episodes of lightheadedness or dizziness. Denies any shortness of breath or chest pain. He believes his activity level is increasing with the gym and playing basketball. He is not as short of breath as he was in the past before heart failure diagnosis. He notes appointment with cardiology next week and is hoping to have echo to evaluate heart function. He was not able to complete sleep study earlier this year due to insurance change. He is agreeable to new order. I did remind patient to complete blood work. Blood pressure borderline. Weight stable.       Hemoglobin A1C (%)   Date Value   07/19/2022 6.0   10/16/2013 5.6             ( goal A1C is < 7)   No components found for: LABMICR  LDL Cholesterol (mg/dL)   Date Value   07/19/2022 118   10/17/2013 96       (goal LDL is <100)   AST (U/L)   Date Value   07/19/2022 19     ALT (U/L)   Date Value   07/19/2022 27     BUN (mg/dL)   Date Value   07/21/2022 15     BP Readings from Last 3 Encounters:   08/09/23 136/88   06/28/23 (!) 136/92   12/02/22 124/82          (goal 120/80)    Past Medical History:   Diagnosis Date    Acute respiratory failure with hypoxia ([address]) 7/6/2020    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

## 2023-08-10 ENCOUNTER — TELEPHONE (OUTPATIENT)
Dept: PRIMARY CARE CLINIC | Age: 43
End: 2023-08-10

## 2023-08-10 NOTE — TELEPHONE ENCOUNTER
Call made to [employer] PA Line to complete PA for Trulicity. PA sent for review by [employer].     Case ID: [id number]

## 2023-08-10 NOTE — TELEPHONE ENCOUNTER
Unable to complete PA for Farxiga on [employer]. Call made to [place], [place] for [address] [address] sent to review with [employer]. Case ID: [id number].

## 2023-08-10 NOTE — TELEPHONE ENCOUNTER
PA for Marva and Trulicity started with [employer] over the phone, please see prior authorization notes from today.

## 2023-08-10 NOTE — TELEPHONE ENCOUNTER
Pt called in stated that his pharmacy is waiting for our office to do a PA on 2 of his medications . Farxiga and Dulaglutide . He gave a number that you can call . [phone]     Could you please see what we can do .      Last Visit Date: 8/9/2023   Next Visit Date: 11/9/2023

## 2023-08-18 RX ORDER — FUROSEMIDE 40 MG/1
40 TABLET ORAL DAILY
Qty: 30 TABLET | Refills: 11 | Status: SHIPPED | OUTPATIENT
Start: 2023-08-18 | End: 2024-08-17

## 2023-08-29 ENCOUNTER — OFFICE VISIT (OUTPATIENT)
Dept: FAMILY MEDICINE CLINIC | Age: 43
End: 2023-08-29
Payer: COMMERCIAL

## 2023-08-29 ENCOUNTER — TELEPHONE (OUTPATIENT)
Dept: PRIMARY CARE CLINIC | Age: 43
End: 2023-08-29

## 2023-08-29 VITALS
DIASTOLIC BLOOD PRESSURE: 84 MMHG | BODY MASS INDEX: 52.62 KG/M2 | HEART RATE: 85 BPM | WEIGHT: 315 LBS | SYSTOLIC BLOOD PRESSURE: 136 MMHG | OXYGEN SATURATION: 96 % | TEMPERATURE: 98.4 F

## 2023-08-29 DIAGNOSIS — S09.93XA DENTAL INJURY, INITIAL ENCOUNTER: Primary | ICD-10-CM

## 2023-08-29 DIAGNOSIS — K08.89 PAIN, DENTAL: Primary | ICD-10-CM

## 2023-08-29 PROCEDURE — 3075F SYST BP GE 130 - 139MM HG: CPT | Performed by: REGISTERED NURSE

## 2023-08-29 PROCEDURE — 3079F DIAST BP 80-89 MM HG: CPT | Performed by: REGISTERED NURSE

## 2023-08-29 PROCEDURE — 99212 OFFICE O/P EST SF 10 MIN: CPT | Performed by: REGISTERED NURSE

## 2023-08-29 RX ORDER — HYDROCODONE BITARTRATE AND ACETAMINOPHEN 5; 325 MG/1; MG/1
1 TABLET ORAL EVERY 6 HOURS PRN
Qty: 12 TABLET | Refills: 0 | Status: SHIPPED | OUTPATIENT
Start: 2023-08-29 | End: 2023-09-01

## 2023-08-29 ASSESSMENT — ENCOUNTER SYMPTOMS
GASTROINTESTINAL NEGATIVE: 1
VOICE CHANGE: 0
TROUBLE SWALLOWING: 0
WHEEZING: 0
SHORTNESS OF BREATH: 0

## 2023-08-29 NOTE — TELEPHONE ENCOUNTER
Patient was at office seen by walk in provider for broken right bottom tooth causing a lot of pain and unable to sleep due to pain. Cannot get in to dentist until Friday. Is requesting Norco for pain. Please advise.

## 2023-08-29 NOTE — TELEPHONE ENCOUNTER
I talked with Yumiko about patient and his case. Plan to send in 3 days worth of [place].  He has dentist appointment on Friday.

## 2023-10-05 ENCOUNTER — TELEPHONE (OUTPATIENT)
Dept: PRIMARY CARE CLINIC | Age: 43
End: 2023-10-05

## 2023-10-16 ENCOUNTER — APPOINTMENT (OUTPATIENT)
Dept: GENERAL RADIOLOGY | Age: 43
End: 2023-10-16
Payer: COMMERCIAL

## 2023-10-16 ENCOUNTER — HOSPITAL ENCOUNTER (EMERGENCY)
Age: 43
Discharge: HOME | End: 2023-10-16
Attending: EMERGENCY MEDICINE
Payer: COMMERCIAL

## 2023-10-16 ENCOUNTER — APPOINTMENT (OUTPATIENT)
Dept: CT IMAGING | Age: 43
End: 2023-10-16
Payer: COMMERCIAL

## 2023-10-16 VITALS
HEART RATE: 86 BPM | HEIGHT: 73 IN | RESPIRATION RATE: 21 BRPM | BODY MASS INDEX: 41.75 KG/M2 | DIASTOLIC BLOOD PRESSURE: 99 MMHG | WEIGHT: 315 LBS | TEMPERATURE: 98.1 F | SYSTOLIC BLOOD PRESSURE: 143 MMHG | OXYGEN SATURATION: 92 %

## 2023-10-16 DIAGNOSIS — R07.89 ATYPICAL CHEST PAIN: Primary | ICD-10-CM

## 2023-10-16 LAB
ALBUMIN SERPL-MCNC: 3.9 G/DL (ref 3.5–5.2)
ALBUMIN/GLOB SERPL: 1.2 {RATIO} (ref 1–2.5)
ALP SERPL-CCNC: 80 U/L (ref 40–129)
ALT SERPL-CCNC: 15 U/L (ref 5–41)
ANION GAP SERPL CALCULATED.3IONS-SCNC: 11 MMOL/L (ref 9–17)
AST SERPL-CCNC: 15 U/L
BASOPHILS # BLD: 0.1 K/UL (ref 0–0.2)
BASOPHILS NFR BLD: 1 % (ref 0–2)
BILIRUB SERPL-MCNC: 0.3 MG/DL (ref 0.3–1.2)
BNP SERPL-MCNC: 153 PG/ML
BUN SERPL-MCNC: 12 MG/DL (ref 6–20)
CALCIUM SERPL-MCNC: 9 MG/DL (ref 8.6–10.4)
CHLORIDE SERPL-SCNC: 102 MMOL/L (ref 98–107)
CO2 SERPL-SCNC: 28 MMOL/L (ref 20–31)
CREAT SERPL-MCNC: 1 MG/DL (ref 0.7–1.2)
D DIMER PPP FEU-MCNC: 0.45 UG/ML FEU
EOSINOPHIL # BLD: 0.2 K/UL (ref 0–0.4)
EOSINOPHILS RELATIVE PERCENT: 2 % (ref 1–4)
ERYTHROCYTE [DISTWIDTH] IN BLOOD BY AUTOMATED COUNT: 14 % (ref 12.5–15.4)
GFR SERPL CREATININE-BSD FRML MDRD: >60 ML/MIN/1.73M2
GLUCOSE SERPL-MCNC: 119 MG/DL (ref 70–99)
HCT VFR BLD AUTO: 40.1 % (ref 41–53)
HGB BLD-MCNC: 13.6 G/DL (ref 13.5–17.5)
LYMPHOCYTES NFR BLD: 1.8 K/UL (ref 1–4.8)
LYMPHOCYTES RELATIVE PERCENT: 23 % (ref 24–44)
MCH RBC QN AUTO: 29.9 PG (ref 26–34)
MCHC RBC AUTO-ENTMCNC: 34 G/DL (ref 31–37)
MCV RBC AUTO: 88 FL (ref 80–100)
MONOCYTES NFR BLD: 0.5 K/UL (ref 0.1–1.2)
MONOCYTES NFR BLD: 7 % (ref 2–11)
NEUTROPHILS NFR BLD: 67 % (ref 36–66)
NEUTS SEG NFR BLD: 5.3 K/UL (ref 1.8–7.7)
PLATELET # BLD AUTO: 256 K/UL (ref 140–450)
PMV BLD AUTO: 8.1 FL (ref 6–12)
POTASSIUM SERPL-SCNC: 3.5 MMOL/L (ref 3.7–5.3)
PROT SERPL-MCNC: 7.1 G/DL (ref 6.4–8.3)
RBC # BLD AUTO: 4.55 M/UL (ref 4.5–5.9)
SODIUM SERPL-SCNC: 141 MMOL/L (ref 135–144)
TROPONIN I SERPL HS-MCNC: 10 NG/L (ref 0–22)
TROPONIN I SERPL HS-MCNC: 12 NG/L (ref 0–22)
WBC OTHER # BLD: 8 K/UL (ref 3.5–11)

## 2023-10-16 PROCEDURE — 84484 ASSAY OF TROPONIN QUANT: CPT

## 2023-10-16 PROCEDURE — 85379 FIBRIN DEGRADATION QUANT: CPT

## 2023-10-16 PROCEDURE — 2580000003 HC RX 258: Performed by: EMERGENCY MEDICINE

## 2023-10-16 PROCEDURE — 36415 COLL VENOUS BLD VENIPUNCTURE: CPT

## 2023-10-16 PROCEDURE — 6370000000 HC RX 637 (ALT 250 FOR IP): Performed by: EMERGENCY MEDICINE

## 2023-10-16 PROCEDURE — 6360000004 HC RX CONTRAST MEDICATION: Performed by: EMERGENCY MEDICINE

## 2023-10-16 PROCEDURE — 80053 COMPREHEN METABOLIC PANEL: CPT

## 2023-10-16 PROCEDURE — 93005 ELECTROCARDIOGRAM TRACING: CPT | Performed by: NURSE PRACTITIONER

## 2023-10-16 PROCEDURE — 85025 COMPLETE CBC W/AUTO DIFF WBC: CPT

## 2023-10-16 PROCEDURE — 71260 CT THORAX DX C+: CPT

## 2023-10-16 PROCEDURE — 71045 X-RAY EXAM CHEST 1 VIEW: CPT

## 2023-10-16 PROCEDURE — 83880 ASSAY OF NATRIURETIC PEPTIDE: CPT

## 2023-10-16 PROCEDURE — 99285 EMERGENCY DEPT VISIT HI MDM: CPT

## 2023-10-16 RX ORDER — 0.9 % SODIUM CHLORIDE 0.9 %
80 INTRAVENOUS SOLUTION INTRAVENOUS ONCE
Status: COMPLETED | OUTPATIENT
Start: 2023-10-16 | End: 2023-10-16

## 2023-10-16 RX ORDER — ASPIRIN 81 MG/1
324 TABLET, CHEWABLE ORAL ONCE
Status: COMPLETED | OUTPATIENT
Start: 2023-10-16 | End: 2023-10-16

## 2023-10-16 RX ORDER — SODIUM CHLORIDE 0.9 % (FLUSH) 0.9 %
5-40 SYRINGE (ML) INJECTION PRN
Status: DISCONTINUED | OUTPATIENT
Start: 2023-10-16 | End: 2023-10-17 | Stop reason: HOSPADM

## 2023-10-16 RX ADMIN — ASPIRIN 324 MG: 81 TABLET, CHEWABLE ORAL at 21:15

## 2023-10-16 RX ADMIN — IOPAMIDOL 75 ML: 755 INJECTION, SOLUTION INTRAVENOUS at 22:02

## 2023-10-16 RX ADMIN — SODIUM CHLORIDE 80 ML: 9 INJECTION, SOLUTION INTRAVENOUS at 22:02

## 2023-10-16 RX ADMIN — SODIUM CHLORIDE, PRESERVATIVE FREE 10 ML: 5 INJECTION INTRAVENOUS at 22:03

## 2023-10-16 ASSESSMENT — ENCOUNTER SYMPTOMS
VOMITING: 0
SHORTNESS OF BREATH: 0
SORE THROAT: 0
ABDOMINAL PAIN: 0
COUGH: 1
DIARRHEA: 0
NAUSEA: 0
BACK PAIN: 0
WHEEZING: 0

## 2023-10-16 ASSESSMENT — PAIN - FUNCTIONAL ASSESSMENT: PAIN_FUNCTIONAL_ASSESSMENT: 0-10

## 2023-10-16 ASSESSMENT — PAIN SCALES - GENERAL: PAINLEVEL_OUTOF10: 7

## 2023-10-16 ASSESSMENT — HEART SCORE: ECG: 1

## 2023-10-17 LAB
EKG ATRIAL RATE: 89 BPM
EKG P AXIS: 29 DEGREES
EKG P-R INTERVAL: 168 MS
EKG Q-T INTERVAL: 420 MS
EKG QRS DURATION: 114 MS
EKG QTC CALCULATION (BAZETT): 511 MS
EKG R AXIS: -45 DEGREES
EKG T AXIS: 51 DEGREES
EKG VENTRICULAR RATE: 89 BPM

## 2023-10-17 NOTE — DISCHARGE INSTRUCTIONS
Return to the Emergency Dept if symptoms worsen or persist  Follow up with your cardiologist as soon as available. Call in the morning to schedule an exact appointment time.

## 2023-10-17 NOTE — ED PROVIDER NOTES
[facility] Emergency Department  [id number] [facility] [address]  Phone: [phone]  Fax: [phone]    eMERGENCY dEPARTMENT eNCOUnter        Pt Name: Ty Jules  MRN: [id number]  [address] 1980  Date of evaluation: 10/16/23    CHIEF COMPLAINT     Chief Complaint   Patient presents with    Chest Pain     Started today and progressively getting worse, L chest deep cramping, hx htn and chf, no thinners     Dizziness       HISTORY OF [address] [place] [address] is a 39 y.o. male who presents with chest pain. Patient presented here with chest pain as well as some dizziness. It began last night a little bit. A little bit of cough for the past 1 to 2 days without any fevers or chills. No flulike symptoms. He is fully COVID vaccinated and has been without any infectious disease exposure. His chest pain is only with deep breaths but he denies any shortness of breath or wheezing. Denies any abdominal pain nausea vomiting diarrhea. He denies any injury or trauma. He stated he has no pain in his legs however he noticed last night while watching football they had swelling of his left ankle asymmetric as compared to the right. No history of PE or DVT. He had cardiac cath about a year ago was completely normal.  He [facility] with [facility] cardiology for left-sided heart failure. He also has hypertension and morbid obesity. He denies any surgery in the history. He smokes cigars and drinks alcohol occasionally. No illicit drug use. REVIEW OF SYSTEMS     Review of Systems   Constitutional:  Negative for chills and fever. HENT:  Negative for congestion, ear pain and sore throat. Respiratory:  Positive for cough. Negative for shortness of breath and wheezing. Cardiovascular:  Positive for chest pain. Negative for palpitations and leg swelling. Gastrointestinal:  Negative for abdominal pain, diarrhea, nausea and vomiting.    Genitourinary:  Negative

## 2023-11-21 ENCOUNTER — OFFICE VISIT (OUTPATIENT)
Dept: PRIMARY CARE CLINIC | Age: 43
End: 2023-11-21
Payer: COMMERCIAL

## 2023-11-21 VITALS
SYSTOLIC BLOOD PRESSURE: 130 MMHG | WEIGHT: 315 LBS | HEIGHT: 73 IN | DIASTOLIC BLOOD PRESSURE: 86 MMHG | RESPIRATION RATE: 16 BRPM | OXYGEN SATURATION: 98 % | BODY MASS INDEX: 41.75 KG/M2 | HEART RATE: 87 BPM

## 2023-11-21 DIAGNOSIS — I50.22 CHRONIC SYSTOLIC HEART FAILURE (HCC): ICD-10-CM

## 2023-11-21 DIAGNOSIS — I42.8 NONISCHEMIC CARDIOMYOPATHY (HCC): Primary | ICD-10-CM

## 2023-11-21 DIAGNOSIS — I10 PRIMARY HYPERTENSION: ICD-10-CM

## 2023-11-21 DIAGNOSIS — E66.01 MORBID OBESITY WITH BMI OF 50.0-59.9, ADULT (HCC): ICD-10-CM

## 2023-11-21 PROCEDURE — G8427 DOCREV CUR MEDS BY ELIG CLIN: HCPCS | Performed by: PHYSICIAN ASSISTANT

## 2023-11-21 PROCEDURE — 1036F TOBACCO NON-USER: CPT | Performed by: PHYSICIAN ASSISTANT

## 2023-11-21 PROCEDURE — 99214 OFFICE O/P EST MOD 30 MIN: CPT | Performed by: PHYSICIAN ASSISTANT

## 2023-11-21 PROCEDURE — G8484 FLU IMMUNIZE NO ADMIN: HCPCS | Performed by: PHYSICIAN ASSISTANT

## 2023-11-21 PROCEDURE — 3078F DIAST BP <80 MM HG: CPT | Performed by: PHYSICIAN ASSISTANT

## 2023-11-21 PROCEDURE — G8417 CALC BMI ABV UP PARAM F/U: HCPCS | Performed by: PHYSICIAN ASSISTANT

## 2023-11-21 PROCEDURE — 3074F SYST BP LT 130 MM HG: CPT | Performed by: PHYSICIAN ASSISTANT

## 2023-11-21 ASSESSMENT — PATIENT HEALTH QUESTIONNAIRE - PHQ9
SUM OF ALL RESPONSES TO PHQ QUESTIONS 1-9: 0
SUM OF ALL RESPONSES TO PHQ QUESTIONS 1-9: 0
SUM OF ALL RESPONSES TO PHQ9 QUESTIONS 1 & 2: 0
2. FEELING DOWN, DEPRESSED OR HOPELESS: 0
SUM OF ALL RESPONSES TO PHQ QUESTIONS 1-9: 0
1. LITTLE INTEREST OR PLEASURE IN DOING THINGS: 0
SUM OF ALL RESPONSES TO PHQ QUESTIONS 1-9: 0

## 2023-11-21 NOTE — PROGRESS NOTES
[address]  PRIMARY CARE  [place] [id number] [address] [address]  Dept: [phone]  Dept Fax: [phone]    José Martin is a 39 y.o. male who presents today for his medical conditions/complaints as noted below. Chief Complaint   Patient presents with    Hypertension       HPI:     Patient presents the office for routine follow-up. He has past medical history including CHF, cardiomyopathy, hypertension, obstructive sleep apnea, GERD, obesity. Patient is following closely with cardiology. No changes. He denies any lightheadedness, dizziness, shortness of breath, chest pain, leg edema, syncope. His energy level/physical activity has been more tolerable since initial diagnosis of CHF. He feels that breathing is easier. Patient is working hard on weight loss, although it is seemingly difficult. He is trying to monitor for excess carbohydrates and nighttime meals. He has tried fasting with little success. Thus far insurance has not covered SGLT2 or GLP-1 despite his multiple risk factors and chronic conditions. Would like to have new blood work completed to determine necessity of new medications. No other concerns or complaints. BP stable.           Hemoglobin A1C (%)   Date Value   07/19/2022 6.0   10/16/2013 5.6             ( goal A1C is < 7)   No components found for: \"LABMICR\"  LDL Cholesterol (mg/dL)   Date Value   07/19/2022 118   10/17/2013 96       (goal LDL is <100)   AST (U/L)   Date Value   10/16/2023 15     ALT (U/L)   Date Value   10/16/2023 15     BUN (mg/dL)   Date Value   10/16/2023 12     BP Readings from Last 3 Encounters:   11/21/23 130/86   10/16/23 (!) 143/99   08/29/23 136/84          (goal 120/80)    Past Medical History:   Diagnosis Date    Acute respiratory failure with hypoxia ([address]) 7/6/2020    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

## 2023-11-24 ASSESSMENT — ENCOUNTER SYMPTOMS
SHORTNESS OF BREATH: 0
ABDOMINAL PAIN: 0
COUGH: 0
VOMITING: 0
BACK PAIN: 0
DIARRHEA: 0
RHINORRHEA: 0
SINUS PAIN: 0
CONSTIPATION: 0
NAUSEA: 0
EYE PAIN: 0

## 2024-03-04 ENCOUNTER — OFFICE VISIT (OUTPATIENT)
Dept: PRIMARY CARE CLINIC | Age: 44
End: 2024-03-04
Payer: COMMERCIAL

## 2024-03-04 ENCOUNTER — HOSPITAL ENCOUNTER (OUTPATIENT)
Age: 44
Setting detail: SPECIMEN
Discharge: HOME | End: 2024-03-04

## 2024-03-04 VITALS
HEIGHT: 73 IN | HEART RATE: 81 BPM | WEIGHT: 315 LBS | RESPIRATION RATE: 16 BRPM | SYSTOLIC BLOOD PRESSURE: 120 MMHG | BODY MASS INDEX: 41.75 KG/M2 | OXYGEN SATURATION: 94 % | DIASTOLIC BLOOD PRESSURE: 84 MMHG

## 2024-03-04 DIAGNOSIS — Z12.5 SCREENING PSA (PROSTATE SPECIFIC ANTIGEN): ICD-10-CM

## 2024-03-04 DIAGNOSIS — I50.22 CHRONIC SYSTOLIC HEART FAILURE (HCC): ICD-10-CM

## 2024-03-04 DIAGNOSIS — E55.9 VITAMIN D DEFICIENCY: ICD-10-CM

## 2024-03-04 DIAGNOSIS — I10 PRIMARY HYPERTENSION: ICD-10-CM

## 2024-03-04 DIAGNOSIS — R73.03 PREDIABETES: ICD-10-CM

## 2024-03-04 DIAGNOSIS — E66.01 MORBID OBESITY WITH BMI OF 50.0-59.9, ADULT (HCC): Primary | ICD-10-CM

## 2024-03-04 DIAGNOSIS — I42.8 NONISCHEMIC CARDIOMYOPATHY (HCC): ICD-10-CM

## 2024-03-04 DIAGNOSIS — Z30.09 ENCOUNTER FOR VASECTOMY COUNSELING: ICD-10-CM

## 2024-03-04 DIAGNOSIS — Z12.11 SCREEN FOR COLON CANCER: ICD-10-CM

## 2024-03-04 LAB
25(OH)D3 SERPL-MCNC: 11.2 NG/ML
ALBUMIN SERPL-MCNC: 3.8 G/DL (ref 3.5–5.2)
ALBUMIN/GLOB SERPL: 1.1 {RATIO} (ref 1–2.5)
ALP SERPL-CCNC: 80 U/L (ref 40–129)
ALT SERPL-CCNC: 11 U/L (ref 5–41)
ANION GAP SERPL CALCULATED.3IONS-SCNC: 12 MMOL/L (ref 9–17)
AST SERPL-CCNC: 12 U/L
BILIRUB SERPL-MCNC: 0.4 MG/DL (ref 0.3–1.2)
BUN SERPL-MCNC: 14 MG/DL (ref 6–20)
CALCIUM SERPL-MCNC: 9.2 MG/DL (ref 8.6–10.4)
CHLORIDE SERPL-SCNC: 103 MMOL/L (ref 98–107)
CHOLEST SERPL-MCNC: 158 MG/DL
CHOLESTEROL/HDL RATIO: 3.7
CO2 SERPL-SCNC: 25 MMOL/L (ref 20–31)
CREAT SERPL-MCNC: 1.1 MG/DL (ref 0.7–1.2)
EST. AVERAGE GLUCOSE BLD GHB EST-MCNC: 131 MG/DL
GFR SERPL CREATININE-BSD FRML MDRD: >60 ML/MIN/1.73M2
GLUCOSE P FAST SERPL-MCNC: 97 MG/DL (ref 70–99)
HBA1C MFR BLD: 6.2 % (ref 4–6)
HDLC SERPL-MCNC: 43 MG/DL
LDLC SERPL CALC-MCNC: 93 MG/DL (ref 0–130)
POTASSIUM SERPL-SCNC: 4.4 MMOL/L (ref 3.7–5.3)
PROT SERPL-MCNC: 7.4 G/DL (ref 6.4–8.3)
PSA SERPL-MCNC: 0.47 NG/ML
SODIUM SERPL-SCNC: 140 MMOL/L (ref 135–144)
TRIGL SERPL-MCNC: 111 MG/DL

## 2024-03-04 PROCEDURE — 1036F TOBACCO NON-USER: CPT | Performed by: PHYSICIAN ASSISTANT

## 2024-03-04 PROCEDURE — 3074F SYST BP LT 130 MM HG: CPT | Performed by: PHYSICIAN ASSISTANT

## 2024-03-04 PROCEDURE — G8484 FLU IMMUNIZE NO ADMIN: HCPCS | Performed by: PHYSICIAN ASSISTANT

## 2024-03-04 PROCEDURE — G8427 DOCREV CUR MEDS BY ELIG CLIN: HCPCS | Performed by: PHYSICIAN ASSISTANT

## 2024-03-04 PROCEDURE — G8417 CALC BMI ABV UP PARAM F/U: HCPCS | Performed by: PHYSICIAN ASSISTANT

## 2024-03-04 PROCEDURE — 3079F DIAST BP 80-89 MM HG: CPT | Performed by: PHYSICIAN ASSISTANT

## 2024-03-04 PROCEDURE — 99214 OFFICE O/P EST MOD 30 MIN: CPT | Performed by: PHYSICIAN ASSISTANT

## 2024-03-04 RX ORDER — TIRZEPATIDE 2.5 MG/.5ML
2.5 INJECTION, SOLUTION SUBCUTANEOUS WEEKLY
Qty: 2 ML | Refills: 0 | Status: SHIPPED | OUTPATIENT
Start: 2024-03-04

## 2024-03-04 SDOH — ECONOMIC STABILITY: FOOD INSECURITY: WITHIN THE PAST 12 MONTHS, THE FOOD YOU BOUGHT JUST DIDN'T LAST AND YOU DIDN'T HAVE MONEY TO GET MORE.: NEVER TRUE

## 2024-03-04 SDOH — ECONOMIC STABILITY: INCOME INSECURITY: HOW HARD IS IT FOR YOU TO PAY FOR THE VERY BASICS LIKE FOOD, HOUSING, MEDICAL CARE, AND HEATING?: NOT HARD AT ALL

## 2024-03-04 SDOH — ECONOMIC STABILITY: FOOD INSECURITY: WITHIN THE PAST 12 MONTHS, YOU WORRIED THAT YOUR FOOD WOULD RUN OUT BEFORE YOU GOT MONEY TO BUY MORE.: NEVER TRUE

## 2024-03-04 ASSESSMENT — ENCOUNTER SYMPTOMS
BACK PAIN: 0
CONSTIPATION: 0
SHORTNESS OF BREATH: 0
VOMITING: 0
NAUSEA: 0
RHINORRHEA: 0
COUGH: 0
DIARRHEA: 0
ABDOMINAL PAIN: 0
SINUS PAIN: 0
EYE PAIN: 0

## 2024-03-04 ASSESSMENT — PATIENT HEALTH QUESTIONNAIRE - PHQ9
SUM OF ALL RESPONSES TO PHQ QUESTIONS 1-9: 0
SUM OF ALL RESPONSES TO PHQ QUESTIONS 1-9: 0
2. FEELING DOWN, DEPRESSED OR HOPELESS: 0
SUM OF ALL RESPONSES TO PHQ9 QUESTIONS 1 & 2: 0
1. LITTLE INTEREST OR PLEASURE IN DOING THINGS: 0
SUM OF ALL RESPONSES TO PHQ QUESTIONS 1-9: 0
SUM OF ALL RESPONSES TO PHQ QUESTIONS 1-9: 0

## 2024-03-04 NOTE — PROGRESS NOTES
Normocephalic.      Mouth/Throat:      Mouth: Mucous membranes are moist.   Eyes:      Extraocular Movements: Extraocular movements intact.      Conjunctiva/sclera: Conjunctivae normal.      Pupils: Pupils are equal, round, and reactive to light.   Cardiovascular:      Rate and Rhythm: Normal rate and regular rhythm.      Pulses: Normal pulses.      Heart sounds: Normal heart sounds.   Pulmonary:      Effort: Pulmonary effort is normal.      Breath sounds: Normal breath sounds.   Abdominal:      General: Abdomen is flat. Bowel sounds are normal.      Palpations: Abdomen is soft.      Tenderness: There is no abdominal tenderness.   Musculoskeletal:      Cervical back: Normal range of motion.      Right lower leg: No edema.      Left lower leg: No edema.   Lymphadenopathy:      Cervical: No cervical adenopathy.   Skin:     General: Skin is warm.      Capillary Refill: Capillary refill takes less than 2 seconds.   Neurological:      General: No focal deficit present.      Mental Status: He is alert and oriented to person, place, and time.   Psychiatric:         Mood and Affect: Mood normal.         Behavior: Behavior normal.       /84 (Site: Left Upper Arm, Position: Sitting, Cuff Size: Large Adult)   Pulse 81   Resp 16   Ht 1.854 m (6' 1\")   Wt (!) 174.3 kg (384 lb 3.2 oz)   SpO2 94%   BMI 50.69 kg/m²     Assessment:       ICD-10-CM    1. Morbid obesity with BMI of 50.0-59.9, adult (HCC)  E66.01 Tirzepatide-Weight Management (ZEPBOUND) 2.5 MG/0.5ML SOAJ    Z68.43       2. Nonischemic cardiomyopathy (HCC)  I42.8       3. Chronic systolic heart failure (HCC)  I50.22       4. Primary hypertension  I10       5. Encounter for vasectomy counseling  Z30.09 Fredy Longo MD, Urology, [place]      6. Screening PSA (prostate specific antigen)  Z12.5 PSA Screening      7. Screen for colon cancer  Z12.11 Giovanni Smith MD, Colorectal Surgery, [place]               Plan:      1.  We discussed

## 2024-03-14 ENCOUNTER — TELEPHONE (OUTPATIENT)
Dept: PRIMARY CARE CLINIC | Age: 44
End: 2024-03-14

## 2024-03-14 NOTE — TELEPHONE ENCOUNTER
Norma from [facility] called into stating pt's zepbound 2.5 mg has been denied and it can be appealed if that is something pt's pcp wants.

## 2024-03-20 RX ORDER — SPIRONOLACTONE 25 MG/1
25 TABLET ORAL DAILY
Qty: 30 TABLET | Refills: 5 | Status: SHIPPED | OUTPATIENT
Start: 2024-03-20 | End: 2025-07-12

## 2024-03-21 ENCOUNTER — TELEPHONE (OUTPATIENT)
Dept: SURGERY | Age: 44
End: 2024-03-21

## 2024-04-19 ENCOUNTER — TELEPHONE (OUTPATIENT)
Dept: SURGERY | Age: 44
End: 2024-04-19

## 2024-04-19 NOTE — TELEPHONE ENCOUNTER
[facility] Surgery  Screening colonoscopy questionnaire  Caprice Lawson    Pt Name: Sorin Olivera  MRN: [id number]  YOB: 1980  Primary Care Physician: Bayron Wolfe PA-C      Have you ever had a colonoscopy? No  When was your last colonoscopy? Na    Have you ever had polyps or abnormal findings on past colonoscopies  na    Have you recently had a stool test to check for colon cancer? No  Was it positive?  na    Do you have any family history of colon cancer? No  If yes, which family member had colon cancer? Na  Were they diagnosed younger or older than the age of 60?  na    Do you have a history of Crohn's disease or Ulcerative Colitis? No    Do you have a history of constipation? No    Do you have a history of bloody or black stools? No    Have you ever had surgery done inside your abdomen? No  What surgery? na    8. Are you taking any blood thinners? No   If yes, what is the name of the blood thinner? na    Scheduling:    Okay to schedule colonoscopy   Reason for colonoscopy: screening for colon cancer.

## 2024-04-23 ENCOUNTER — TELEPHONE (OUTPATIENT)
Dept: SURGERY | Age: 44
End: 2024-04-23

## 2024-04-25 ENCOUNTER — TELEPHONE (OUTPATIENT)
Dept: SURGERY | Age: 44
End: 2024-04-25

## 2024-04-25 NOTE — TELEPHONE ENCOUNTER
Spoke to patient, surgery scheduled at [place]. Patient confirmed and information mailed to patient(s) 4/25/24.    Surgery date/time: 5/7/24 @ 10:30am  Arrival time: 9:00am

## 2024-04-26 RX ORDER — SOD SULF/POT CHLORIDE/MAG SULF 1.479 G
TABLET ORAL
Qty: 24 TABLET | Refills: 0 | Status: SHIPPED | OUTPATIENT
Start: 2024-04-26

## 2024-05-01 ENCOUNTER — TELEPHONE (OUTPATIENT)
Dept: SURGERY | Age: 44
End: 2024-05-01

## 2024-05-01 NOTE — TELEPHONE ENCOUNTER
Pt will return call with his [employer] information. Writer will call auth dept [phone] with updated info to run auth for colonoscopy on 5/7/24.

## 2024-05-02 ENCOUNTER — TELEPHONE (OUTPATIENT)
Dept: SURGERY | Age: 44
End: 2024-05-02

## 2024-05-02 NOTE — TELEPHONE ENCOUNTER
Patient returned call with insurance information.     [facility] [employer]    Member ID: [id number]  Group: [id number]

## 2024-05-02 NOTE — TELEPHONE ENCOUNTER
[place] stating we need his resmio commercial INS information to authorize for colonoscopy on 5/7/24.

## 2024-05-06 ENCOUNTER — ANESTHESIA EVENT (OUTPATIENT)
Dept: OPERATING ROOM | Age: 44
End: 2024-05-06
Payer: COMMERCIAL

## 2024-05-07 ENCOUNTER — ANESTHESIA (OUTPATIENT)
Dept: OPERATING ROOM | Age: 44
End: 2024-05-07
Payer: COMMERCIAL

## 2024-05-07 ENCOUNTER — HOSPITAL ENCOUNTER (OUTPATIENT)
Age: 44
Setting detail: OUTPATIENT SURGERY
Discharge: HOME | End: 2024-05-07
Attending: COLON & RECTAL SURGERY | Admitting: COLON & RECTAL SURGERY
Payer: COMMERCIAL

## 2024-05-07 VITALS
HEART RATE: 78 BPM | RESPIRATION RATE: 12 BRPM | TEMPERATURE: 98.8 F | DIASTOLIC BLOOD PRESSURE: 86 MMHG | BODY MASS INDEX: 42.66 KG/M2 | SYSTOLIC BLOOD PRESSURE: 116 MMHG | OXYGEN SATURATION: 93 % | HEIGHT: 72 IN | WEIGHT: 315 LBS

## 2024-05-07 DIAGNOSIS — Z12.11 SCREENING FOR COLON CANCER: ICD-10-CM

## 2024-05-07 PROCEDURE — 88305 TISSUE EXAM BY PATHOLOGIST: CPT

## 2024-05-07 PROCEDURE — 2580000003 HC RX 258: Performed by: STUDENT IN AN ORGANIZED HEALTH CARE EDUCATION/TRAINING PROGRAM

## 2024-05-07 PROCEDURE — 2500000003 HC RX 250 WO HCPCS

## 2024-05-07 PROCEDURE — 2709999900 HC NON-CHARGEABLE SUPPLY: Performed by: COLON & RECTAL SURGERY

## 2024-05-07 PROCEDURE — 3700000000 HC ANESTHESIA ATTENDED CARE: Performed by: COLON & RECTAL SURGERY

## 2024-05-07 PROCEDURE — 3700000001 HC ADD 15 MINUTES (ANESTHESIA): Performed by: COLON & RECTAL SURGERY

## 2024-05-07 PROCEDURE — 7100000011 HC PHASE II RECOVERY - ADDTL 15 MIN: Performed by: COLON & RECTAL SURGERY

## 2024-05-07 PROCEDURE — 3609027000 HC COLONOSCOPY: Performed by: COLON & RECTAL SURGERY

## 2024-05-07 PROCEDURE — 45385 COLONOSCOPY W/LESION REMOVAL: CPT | Performed by: COLON & RECTAL SURGERY

## 2024-05-07 PROCEDURE — 6360000002 HC RX W HCPCS

## 2024-05-07 PROCEDURE — 7100000010 HC PHASE II RECOVERY - FIRST 15 MIN: Performed by: COLON & RECTAL SURGERY

## 2024-05-07 RX ORDER — KETAMINE HYDROCHLORIDE 10 MG/ML
INJECTION, SOLUTION INTRAMUSCULAR; INTRAVENOUS PRN
Status: DISCONTINUED | OUTPATIENT
Start: 2024-05-07 | End: 2024-05-07 | Stop reason: SDUPTHER

## 2024-05-07 RX ORDER — HYDRALAZINE HYDROCHLORIDE 20 MG/ML
10 INJECTION INTRAMUSCULAR; INTRAVENOUS
Status: CANCELLED | OUTPATIENT
Start: 2024-05-07

## 2024-05-07 RX ORDER — GLYCOPYRROLATE 0.2 MG/ML
INJECTION INTRAMUSCULAR; INTRAVENOUS PRN
Status: DISCONTINUED | OUTPATIENT
Start: 2024-05-07 | End: 2024-05-07 | Stop reason: SDUPTHER

## 2024-05-07 RX ORDER — SODIUM CHLORIDE 0.9 % (FLUSH) 0.9 %
5-40 SYRINGE (ML) INJECTION PRN
Status: CANCELLED | OUTPATIENT
Start: 2024-05-07

## 2024-05-07 RX ORDER — LIDOCAINE HYDROCHLORIDE 10 MG/ML
INJECTION, SOLUTION INFILTRATION; PERINEURAL PRN
Status: DISCONTINUED | OUTPATIENT
Start: 2024-05-07 | End: 2024-05-07 | Stop reason: SDUPTHER

## 2024-05-07 RX ORDER — MIDAZOLAM HYDROCHLORIDE 2 MG/2ML
2 INJECTION, SOLUTION INTRAMUSCULAR; INTRAVENOUS
Status: CANCELLED | OUTPATIENT
Start: 2024-05-07 | End: 2024-05-08

## 2024-05-07 RX ORDER — SODIUM CHLORIDE 9 MG/ML
INJECTION, SOLUTION INTRAVENOUS PRN
Status: CANCELLED | OUTPATIENT
Start: 2024-05-07

## 2024-05-07 RX ORDER — SODIUM CHLORIDE 0.9 % (FLUSH) 0.9 %
5-40 SYRINGE (ML) INJECTION EVERY 12 HOURS SCHEDULED
Status: CANCELLED | OUTPATIENT
Start: 2024-05-07

## 2024-05-07 RX ORDER — LABETALOL HYDROCHLORIDE 5 MG/ML
10 INJECTION, SOLUTION INTRAVENOUS
Status: CANCELLED | OUTPATIENT
Start: 2024-05-07

## 2024-05-07 RX ORDER — ONDANSETRON 2 MG/ML
4 INJECTION INTRAMUSCULAR; INTRAVENOUS
Status: CANCELLED | OUTPATIENT
Start: 2024-05-07 | End: 2024-05-08

## 2024-05-07 RX ORDER — MEPERIDINE HYDROCHLORIDE 50 MG/ML
12.5 INJECTION INTRAMUSCULAR; INTRAVENOUS; SUBCUTANEOUS ONCE
Status: CANCELLED | OUTPATIENT
Start: 2024-05-07 | End: 2024-05-07

## 2024-05-07 RX ORDER — SODIUM CHLORIDE, SODIUM LACTATE, POTASSIUM CHLORIDE, CALCIUM CHLORIDE 600; 310; 30; 20 MG/100ML; MG/100ML; MG/100ML; MG/100ML
INJECTION, SOLUTION INTRAVENOUS CONTINUOUS
Status: DISCONTINUED | OUTPATIENT
Start: 2024-05-07 | End: 2024-05-07 | Stop reason: HOSPADM

## 2024-05-07 RX ORDER — LIDOCAINE HYDROCHLORIDE 10 MG/ML
1 INJECTION, SOLUTION EPIDURAL; INFILTRATION; INTRACAUDAL; PERINEURAL
Status: DISCONTINUED | OUTPATIENT
Start: 2024-05-07 | End: 2024-05-07 | Stop reason: HOSPADM

## 2024-05-07 RX ORDER — SODIUM CHLORIDE 9 MG/ML
INJECTION, SOLUTION INTRAVENOUS PRN
Status: DISCONTINUED | OUTPATIENT
Start: 2024-05-07 | End: 2024-05-07 | Stop reason: HOSPADM

## 2024-05-07 RX ORDER — MIDAZOLAM HYDROCHLORIDE 1 MG/ML
INJECTION INTRAMUSCULAR; INTRAVENOUS PRN
Status: DISCONTINUED | OUTPATIENT
Start: 2024-05-07 | End: 2024-05-07 | Stop reason: SDUPTHER

## 2024-05-07 RX ORDER — MORPHINE SULFATE 2 MG/ML
1 INJECTION, SOLUTION INTRAMUSCULAR; INTRAVENOUS EVERY 5 MIN PRN
Status: CANCELLED | OUTPATIENT
Start: 2024-05-07

## 2024-05-07 RX ORDER — NALOXONE HYDROCHLORIDE 0.4 MG/ML
INJECTION, SOLUTION INTRAMUSCULAR; INTRAVENOUS; SUBCUTANEOUS PRN
Status: CANCELLED | OUTPATIENT
Start: 2024-05-07

## 2024-05-07 RX ORDER — SODIUM CHLORIDE 0.9 % (FLUSH) 0.9 %
5-40 SYRINGE (ML) INJECTION PRN
Status: DISCONTINUED | OUTPATIENT
Start: 2024-05-07 | End: 2024-05-07 | Stop reason: HOSPADM

## 2024-05-07 RX ORDER — DIPHENHYDRAMINE HYDROCHLORIDE 50 MG/ML
12.5 INJECTION INTRAMUSCULAR; INTRAVENOUS
Status: CANCELLED | OUTPATIENT
Start: 2024-05-07 | End: 2024-05-08

## 2024-05-07 RX ORDER — PROPOFOL 10 MG/ML
INJECTION, EMULSION INTRAVENOUS PRN
Status: DISCONTINUED | OUTPATIENT
Start: 2024-05-07 | End: 2024-05-07 | Stop reason: SDUPTHER

## 2024-05-07 RX ORDER — OXYCODONE HYDROCHLORIDE 5 MG/1
10 TABLET ORAL PRN
Status: CANCELLED | OUTPATIENT
Start: 2024-05-07 | End: 2024-05-07

## 2024-05-07 RX ORDER — METOCLOPRAMIDE HYDROCHLORIDE 5 MG/ML
10 INJECTION INTRAMUSCULAR; INTRAVENOUS
Status: CANCELLED | OUTPATIENT
Start: 2024-05-07 | End: 2024-05-08

## 2024-05-07 RX ORDER — OXYCODONE HYDROCHLORIDE 5 MG/1
5 TABLET ORAL PRN
Status: CANCELLED | OUTPATIENT
Start: 2024-05-07 | End: 2024-05-07

## 2024-05-07 RX ORDER — SODIUM CHLORIDE 0.9 % (FLUSH) 0.9 %
5-40 SYRINGE (ML) INJECTION EVERY 12 HOURS SCHEDULED
Status: DISCONTINUED | OUTPATIENT
Start: 2024-05-07 | End: 2024-05-07 | Stop reason: HOSPADM

## 2024-05-07 RX ADMIN — KETAMINE HYDROCHLORIDE 25 MG: 10 INJECTION INTRAMUSCULAR; INTRAVENOUS at 10:03

## 2024-05-07 RX ADMIN — KETAMINE HYDROCHLORIDE 25 MG: 10 INJECTION INTRAMUSCULAR; INTRAVENOUS at 10:09

## 2024-05-07 RX ADMIN — MIDAZOLAM 2 MG: 1 INJECTION INTRAMUSCULAR; INTRAVENOUS at 10:00

## 2024-05-07 RX ADMIN — PROPOFOL 70 MG: 10 INJECTION, EMULSION INTRAVENOUS at 10:03

## 2024-05-07 RX ADMIN — LIDOCAINE HYDROCHLORIDE 40 MG: 10 INJECTION, SOLUTION INFILTRATION; PERINEURAL at 10:03

## 2024-05-07 RX ADMIN — SODIUM CHLORIDE, POTASSIUM CHLORIDE, SODIUM LACTATE AND CALCIUM CHLORIDE: 600; 310; 30; 20 INJECTION, SOLUTION INTRAVENOUS at 10:00

## 2024-05-07 RX ADMIN — GLYCOPYRROLATE 0.2 MG: 0.2 INJECTION INTRAMUSCULAR; INTRAVENOUS at 10:00

## 2024-05-07 RX ADMIN — PROPOFOL 150 MCG/KG/MIN: 10 INJECTION, EMULSION INTRAVENOUS at 10:04

## 2024-05-07 ASSESSMENT — PAIN - FUNCTIONAL ASSESSMENT
PAIN_FUNCTIONAL_ASSESSMENT: NONE - DENIES PAIN
PAIN_FUNCTIONAL_ASSESSMENT: 0-10

## 2024-05-07 ASSESSMENT — ENCOUNTER SYMPTOMS: SHORTNESS OF BREATH: 1

## 2024-05-07 NOTE — H&P
[facility] [place]  [address]  [facility] [address]  Dept: [phone]  Loc: [phone]    Patient:  Sorin Olivera  YOB: 1980  Date: 5/7/2024     The patient is a 44 y.o. male who presents today for consult of the following problems:     Chief Complaint: Presents for initial screening colonoscopy.    HPI:   This pleasant 44-year-old -American male patient presents with a need for colonoscopy for cancer screening.  He denies any GI related symptoms.  He has never had a colonoscopy before.  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 ([facility]) 3/18/2022    GERD (gastroesophageal reflux disease)     Headache(784.0)     Hyperlipidemia     Hypertension     Morbid obesity with BMI of 45.0-49.9, adult ([facility])     Patient in clinical research study 07/07/2020    Convalescent plasma study day of completion 7/11/20    Unilateral paresis ([facility]) 10/29/2013    Unspecified sleep apnea     Noncompliant with CPAP     Past Surgical History:   Procedure Laterality Date    DENTAL SURGERY       Family History   Problem Relation Age of Onset    Diabetes Mother     High Blood Pressure Father     Cancer Maternal Grandmother      Social History     Tobacco Use    Smoking status: Never    Smokeless tobacco: Never   Vaping Use    Vaping Use: Never used   Substance Use Topics    Alcohol use: Not Currently     Comment: none    Drug use: No     Current Facility-Administered Medications   Medication Dose Route Frequency Provider Last Rate Last Admin    lidocaine PF 1 % injection 1 mL  1 mL IntraDERmal Once PRN Lula Hawkins MD        lactated ringers IV soln infusion   IntraVENous Continuous Lula Hawkins MD        sodium chloride flush 0.9 % injection 5-40 mL  5-40 mL

## 2024-05-07 NOTE — OP NOTE
Colonoscopy Operative Report    Pre-operative Diagnosis: Screening    Post-operative Diagnosis: Colonic polyp, sigmoid diverticulosis.    Procedure: Colonoscopy with snare polypectomy    Surgeon: VENTURA Navas  Assistant: RN/ Technician    IV Medications: Monitored anesthesia administered by anesthesiologist    Complications: None    Estimated blood loss: None    Bowel Prep Quality:    []  Good    []  Some fecal material but acceptable    []  Unacceptable      Findings:       [x]  Polyps  #1, 8 mm in size, located in the cecum removed by cold snare and retrieved for pathology    [x]  Diverticulosis noted at sigmoid colon     Specimens:   ID Type Source Tests Collected by Time Destination   A : cecal polyp Tissue Tissue SURGICAL PATHOLOGY Giovanni Navas MD 5/7/2024 1009        Implants:  * No implants in log *      Drains:  * No LDAs found *    Details: The patient was placed in left lateral position on the operating table. After the initiation of intravenous sedatives by the anesthesiologist, digital rectal exam was done, and a flexible pediatric colonoscope of variable stiffness was inserted per rectum and advanced all the way to cecum under direct vision.    The cecum was recognized by the ileocecal valve and appendiceal orifice.    The colonoscope was withdrawn, and the ascending, transverse, descending, and sigmoid segments of the colon as well as the rectum were carefully examined.   The tip of the colonoscope was retroflexed in the rectum to inspect the anorectal segment.   Findings listed above were noted, and actions listed above undertaken,  [x]  Polypectomy/ biopsy sites were inspected for hemostasis and noted to be satisfactory.    Clinical Impression: Cecal polyp and uncomplicated sigmoid diverticulosis.    Clinical Recommendation: Will depend on histology report for    Electronically signed by Giovanni Navas MD on 5/7/2024 at 10:21 AM

## 2024-05-07 NOTE — DISCHARGE INSTRUCTIONS
Colonoscopy / Sigmoidoscopy Discharge Instructions  Giovanni Navas M.D.    You may have a regular diet unless instructed otherwise and if you do not have any abdominal (belly) pains, nausea, vomiting or fever.    No driving, operating machinery or making important decisions for the next 24 hours.    It is normal to feel distended or full in the abdomen; passing gas will help relieve the fullness.  The fullness is from the air put inside your bowel (colon) during the procedure.    It is normal to pass small amounts of blood after the colonoscopy.    Findings today included:  Polyps 1 in number removed / biopsied  Diverticulosis (presence of pockets on colon)    Medications:   No changes to home medication therapy.   Psyllium (Metamucil) and high fiber diet  Hold Aspirin and related pain meds (Ibuprofen, Naproxen, etc) except Acetaminophen  for 7 days.    We will call you within a week with results of pathology tests on the tissue that was removed.    Schedule a follow up visit as needed.    Below are abnormal symptoms which will need medical attention; please call the office at any time if these occur.  Large amount of bleeding through the rectum   Severe abdominal pain or pain in the belly (it is normal to have occasional mild discomfort).  Fever of 101F or above, chills or excessive sweating.  Persistent nausea or vomiting.

## 2024-05-07 NOTE — ANESTHESIA PRE PROCEDURE
Department of Anesthesiology  Preprocedure Note       Name:  Sorin Olivera   Age:  44 y.o.  :  1980                                          MRN:  [id number]         Date:  2024      Surgeon: Surgeon(s):  Giovanni Navas MD    Procedure: Procedure(s):  COLORECTAL CANCER SCREENING, NOT HIGH RISK    Medications prior to admission:   Prior to Admission medications    Medication Sig Start Date End Date Taking? Authorizing Provider   Sodium Sulfate-Mag Sulfate-KCl (SUTAB) 0990-022-018 MG TABS Take as directed by office for colonoscopy. 24   Giovanni Navas MD   spironolactone (ALDACTONE) 25 MG tablet Take 1 tablet by mouth daily 3/20/24 7/12/25  Bayron Wolfe PA-C   Tirzepatide-Weight Management (ZEPBOUND) 2.5 MG/0.5ML SOAJ Inject 2.5 mg into the skin once a week  Patient not taking: Reported on 2024 3/4/24   Bayron Wolfe PA-C   Sacubitril-Valsartan (ENTRESTO PO) Take by mouth in the morning and at bedtime    ProviderKelly MD   furosemide (LASIX) 40 MG tablet Take 1 tablet by mouth daily  Patient taking differently: Take 1 tablet by mouth 2 times daily 23  Bayron Wolfe PA-C   metoprolol (LOPRESSOR) 100 MG tablet Take 1 tablet by mouth 2 times daily    Provider, MD Kelly       Current medications:    Current Facility-Administered Medications   Medication Dose Route Frequency Provider Last Rate Last Admin   • lidocaine PF 1 % injection 1 mL  1 mL IntraDERmal Once PRN Lula Hawkins MD       • lactated ringers IV soln infusion   IntraVENous Continuous Lula Hawkins MD       • sodium chloride flush 0.9 % injection 5-40 mL  5-40 mL IntraVENous 2 times per day Lula Hawkins MD       • sodium chloride flush 0.9 % injection 5-40 mL  5-40 mL IntraVENous PRN Lula Hawkins MD       • 0.9 % sodium chloride infusion   IntraVENous PRN Lula Hawkins MD           Allergies:    Allergies   Allergen Reactions   • Pcn [Penicillins]        Problem List:    Patient

## 2024-05-07 NOTE — ANESTHESIA POSTPROCEDURE EVALUATION
Department of Anesthesiology  Postprocedure Note    Patient: Sorin Olivera  MRN: [id number]  YOB: 1980  Date of evaluation: 5/7/2024    Procedure Summary       Date: 05/07/24 Room / Location: [facility] PROCEDURE ROOM / [facility]    Anesthesia Start: 1000 Anesthesia Stop: 1030    Procedure: COLONOSCOPY WITH POLYPECTOMY Diagnosis:       Screening for colon cancer      (Screening for colon cancer [Z12.11])    Surgeons: Giovanni Navas MD Responsible Provider: Lily Yi MD    Anesthesia Type: MAC ASA Status: 3            Anesthesia Type: No value filed.    Sasha Phase I: Sasha Score: 10    Sasha Phase II: Sasha Score: 9    Anesthesia Post Evaluation    Patient location during evaluation: PACU  Patient participation: complete - patient participated  Level of consciousness: awake and alert  Airway patency: patent  Nausea & Vomiting: no nausea and no vomiting  Cardiovascular status: blood pressure returned to baseline  Respiratory status: acceptable and room air  Hydration status: euvolemic  Pain management: adequate and satisfactory to patient    No notable events documented.

## 2024-05-08 LAB — SURGICAL PATHOLOGY REPORT: NORMAL

## 2024-06-06 PROBLEM — Z12.11 SCREENING FOR COLON CANCER: Status: RESOLVED | Noted: 2024-05-07 | Resolved: 2024-06-06

## 2024-08-02 DIAGNOSIS — E66.01 MORBID OBESITY WITH BMI OF 50.0-59.9, ADULT (HCC): ICD-10-CM

## 2024-08-02 NOTE — TELEPHONE ENCOUNTER
Last OV:  3/4/2024    Next OV: 8/13/2024    Pharmacy:   [id number] - [place], [place] - [address] [phone] - F [phone]  [address] [address]  Phone: [phone] Fax: [phone]       Confirmed? Yes     Pt states medication needs PA.

## 2024-08-05 RX ORDER — TIRZEPATIDE 2.5 MG/.5ML
2.5 INJECTION, SOLUTION SUBCUTANEOUS WEEKLY
Qty: 2 ML | Refills: 0 | Status: SHIPPED | OUTPATIENT
Start: 2024-08-05

## 2024-08-13 ENCOUNTER — OFFICE VISIT (OUTPATIENT)
Dept: PRIMARY CARE CLINIC | Age: 44
End: 2024-08-13
Payer: COMMERCIAL

## 2024-08-13 ENCOUNTER — TELEPHONE (OUTPATIENT)
Dept: PRIMARY CARE CLINIC | Age: 44
End: 2024-08-13

## 2024-08-13 ENCOUNTER — HOSPITAL ENCOUNTER (OUTPATIENT)
Age: 44
Setting detail: SPECIMEN
Discharge: HOME | End: 2024-08-13

## 2024-08-13 VITALS
BODY MASS INDEX: 42.66 KG/M2 | HEART RATE: 87 BPM | OXYGEN SATURATION: 93 % | RESPIRATION RATE: 16 BRPM | WEIGHT: 315 LBS | SYSTOLIC BLOOD PRESSURE: 124 MMHG | HEIGHT: 72 IN | DIASTOLIC BLOOD PRESSURE: 82 MMHG

## 2024-08-13 DIAGNOSIS — I42.8 NONISCHEMIC CARDIOMYOPATHY (HCC): Primary | ICD-10-CM

## 2024-08-13 DIAGNOSIS — I50.22 CHRONIC SYSTOLIC HEART FAILURE (HCC): ICD-10-CM

## 2024-08-13 DIAGNOSIS — R73.03 PREDIABETES: ICD-10-CM

## 2024-08-13 DIAGNOSIS — E66.01 MORBID OBESITY WITH BMI OF 50.0-59.9, ADULT (HCC): ICD-10-CM

## 2024-08-13 DIAGNOSIS — E55.9 VITAMIN D DEFICIENCY: ICD-10-CM

## 2024-08-13 DIAGNOSIS — I10 PRIMARY HYPERTENSION: ICD-10-CM

## 2024-08-13 LAB
EST. AVERAGE GLUCOSE BLD GHB EST-MCNC: 134 MG/DL
HBA1C MFR BLD: 6.3 % (ref 4–6)

## 2024-08-13 PROCEDURE — 3074F SYST BP LT 130 MM HG: CPT | Performed by: PHYSICIAN ASSISTANT

## 2024-08-13 PROCEDURE — 99214 OFFICE O/P EST MOD 30 MIN: CPT | Performed by: PHYSICIAN ASSISTANT

## 2024-08-13 PROCEDURE — 3079F DIAST BP 80-89 MM HG: CPT | Performed by: PHYSICIAN ASSISTANT

## 2024-08-13 RX ORDER — ERGOCALCIFEROL 1.25 MG/1
50000 CAPSULE ORAL WEEKLY
Qty: 4 CAPSULE | Refills: 2 | Status: SHIPPED | OUTPATIENT
Start: 2024-08-13

## 2024-08-13 SDOH — ECONOMIC STABILITY: FOOD INSECURITY: WITHIN THE PAST 12 MONTHS, THE FOOD YOU BOUGHT JUST DIDN'T LAST AND YOU DIDN'T HAVE MONEY TO GET MORE.: NEVER TRUE

## 2024-08-13 SDOH — ECONOMIC STABILITY: FOOD INSECURITY: WITHIN THE PAST 12 MONTHS, YOU WORRIED THAT YOUR FOOD WOULD RUN OUT BEFORE YOU GOT MONEY TO BUY MORE.: NEVER TRUE

## 2024-08-13 SDOH — ECONOMIC STABILITY: INCOME INSECURITY: HOW HARD IS IT FOR YOU TO PAY FOR THE VERY BASICS LIKE FOOD, HOUSING, MEDICAL CARE, AND HEATING?: NOT HARD AT ALL

## 2024-08-13 ASSESSMENT — ENCOUNTER SYMPTOMS
SHORTNESS OF BREATH: 0
CONSTIPATION: 0
VOMITING: 0
COUGH: 0
SINUS PAIN: 0
EYE PAIN: 0
NAUSEA: 0
ABDOMINAL PAIN: 0
DIARRHEA: 0
BACK PAIN: 0
RHINORRHEA: 0

## 2024-08-13 ASSESSMENT — PATIENT HEALTH QUESTIONNAIRE - PHQ9
SUM OF ALL RESPONSES TO PHQ9 QUESTIONS 1 & 2: 0
SUM OF ALL RESPONSES TO PHQ QUESTIONS 1-9: 0
1. LITTLE INTEREST OR PLEASURE IN DOING THINGS: NOT AT ALL
2. FEELING DOWN, DEPRESSED OR HOPELESS: NOT AT ALL
SUM OF ALL RESPONSES TO PHQ QUESTIONS 1-9: 0

## 2024-08-13 NOTE — PROGRESS NOTES
[facility]  [facility]  [address] [place] [address] [address]  [address]  [facility] [address]  Dept: [phone]  Dept Fax: [phone]    Sorin Olivera is a 44 y.o. male who presents today for his medical conditions/complaints as noted below.    Chief Complaint   Patient presents with    Discuss Labs    Discuss Medications     Weight loss medication - Phentermine and Topiromate       HPI:     Patient presents the office for routine follow-up.  He has past medical history including hypertension, CHF, JOE, GERD, prediabetes, obesity.    Overall, patient feels that he is doing well.  There is no shortness of breath, lightheadedness, dizziness, syncope.  No chest pains.  He is following with cardiology next week.  Likely update echo.    He does have concern of his weight.  He is steadily gaining weight in his adult.  Since last office visit he is up 5 pounds.  He has had difficulty obtaining GLP-1 through pharmacy.  He would like to know if he is a candidate for phentermine and I advised against due to his cardiac history.    No other acute changes.  BP stable.    Congestive Heart Failure  Presents for follow-up visit. Pertinent negatives include no abdominal pain, chest pain, chest pressure, edema, fatigue or shortness of breath. The symptoms have been improving. Compliance with total regimen is %. Compliance problems include adherence to diet and adherence to exercise.  Compliance with medications is %.       Hemoglobin A1C (%)   Date Value   03/04/2024 6.2 (H)   07/19/2022 6.0   10/16/2013 5.6             ( goal A1C is < 7)   No components found for: \"LABMICR\"  No components found for: \"LDLCHOLESTEROL\", \"LDLCALC\"    (goal LDL is <100)   AST (U/L)   Date Value   03/04/2024 12     ALT (U/L)   Date Value   03/04/2024 11     BUN (mg/dL)   Date Value   03/04/2024 14     BP Readings from Last 3 Encounters:   08/13/24 124/82   05/07/24 116/86   03/04/24 120/84          (goal

## 2024-08-14 DIAGNOSIS — R73.03 PREDIABETES: ICD-10-CM

## 2024-08-14 DIAGNOSIS — E66.01 MORBID OBESITY WITH BMI OF 50.0-59.9, ADULT (HCC): ICD-10-CM

## 2024-08-14 DIAGNOSIS — E88.810 METABOLIC SYNDROME: ICD-10-CM

## 2024-08-14 DIAGNOSIS — I50.22 CHRONIC SYSTOLIC HEART FAILURE (HCC): Primary | ICD-10-CM

## 2024-08-16 NOTE — TELEPHONE ENCOUNTER
PA for Trulicity denied for the following reason(s):    Coverage is only for diagnosis of Type 2 Diabetes Mellitus

## 2024-10-21 ENCOUNTER — TELEPHONE (OUTPATIENT)
Dept: PRIMARY CARE CLINIC | Age: 44
End: 2024-10-21

## 2024-10-21 DIAGNOSIS — F41.9 ANXIETY: Primary | ICD-10-CM

## 2024-10-21 NOTE — TELEPHONE ENCOUNTER
Patient called in asking if PCP can send in a script for valium for when he has his MRI on 11/5/24. He is having it done for his heart and he gets anxiety when in closed spaces.     Please send to [facility] on [address].       Please advise

## 2024-10-22 RX ORDER — DIAZEPAM 5 MG/1
TABLET ORAL
Qty: 1 TABLET | Refills: 0 | Status: SHIPPED | OUTPATIENT
Start: 2024-10-22 | End: 2024-10-24

## 2025-02-18 ENCOUNTER — TELEPHONE (OUTPATIENT)
Dept: PRIMARY CARE CLINIC | Age: 45
End: 2025-02-18

## 2025-02-18 NOTE — TELEPHONE ENCOUNTER
patient called the office requesting that his FMLA form be filled out and mentioned he plans to drop it off tomorrow. writer informed the patient that his PCP might need to see him first before completing the form. The patient acknowledged this and confirmed he will still drop off the form tomorrow. The FMLA request pertains to his hypertension and congestive heart failure.

## 2025-02-28 RX ORDER — FUROSEMIDE 40 MG/1
40 TABLET ORAL DAILY
Qty: 30 TABLET | Refills: 11 | Status: SHIPPED | OUTPATIENT
Start: 2025-02-28 | End: 2026-02-28

## 2025-03-04 ENCOUNTER — OFFICE VISIT (OUTPATIENT)
Dept: PRIMARY CARE CLINIC | Age: 45
End: 2025-03-04

## 2025-03-04 VITALS
RESPIRATION RATE: 16 BRPM | BODY MASS INDEX: 42.66 KG/M2 | DIASTOLIC BLOOD PRESSURE: 86 MMHG | WEIGHT: 315 LBS | OXYGEN SATURATION: 94 % | HEART RATE: 95 BPM | SYSTOLIC BLOOD PRESSURE: 134 MMHG | HEIGHT: 72 IN

## 2025-03-04 DIAGNOSIS — L73.8 FOLLICULITIS BARBAE: ICD-10-CM

## 2025-03-04 DIAGNOSIS — R53.83 FATIGUE, UNSPECIFIED TYPE: ICD-10-CM

## 2025-03-04 DIAGNOSIS — I10 PRIMARY HYPERTENSION: Primary | ICD-10-CM

## 2025-03-04 DIAGNOSIS — E66.01 MORBID OBESITY WITH BMI OF 50.0-59.9, ADULT: ICD-10-CM

## 2025-03-04 DIAGNOSIS — Z12.5 ENCOUNTER FOR SCREENING PROSTATE SPECIFIC ANTIGEN (PSA) MEASUREMENT: ICD-10-CM

## 2025-03-04 DIAGNOSIS — R73.03 PREDIABETES: ICD-10-CM

## 2025-03-04 PROCEDURE — 3075F SYST BP GE 130 - 139MM HG: CPT | Performed by: PHYSICIAN ASSISTANT

## 2025-03-04 PROCEDURE — 3079F DIAST BP 80-89 MM HG: CPT | Performed by: PHYSICIAN ASSISTANT

## 2025-03-04 PROCEDURE — 99214 OFFICE O/P EST MOD 30 MIN: CPT | Performed by: PHYSICIAN ASSISTANT

## 2025-03-04 RX ORDER — METOPROLOL SUCCINATE 100 MG/1
100 TABLET, EXTENDED RELEASE ORAL DAILY
COMMUNITY
Start: 2025-01-06

## 2025-03-04 RX ORDER — CARVEDILOL 25 MG/1
25 TABLET ORAL 2 TIMES DAILY
COMMUNITY
Start: 2025-01-09

## 2025-03-04 RX ORDER — DOXYCYCLINE HYCLATE 100 MG
100 TABLET ORAL DAILY
Qty: 30 TABLET | Refills: 0 | Status: SHIPPED | OUTPATIENT
Start: 2025-03-04 | End: 2025-04-03

## 2025-03-04 SDOH — ECONOMIC STABILITY: FOOD INSECURITY: WITHIN THE PAST 12 MONTHS, THE FOOD YOU BOUGHT JUST DIDN'T LAST AND YOU DIDN'T HAVE MONEY TO GET MORE.: NEVER TRUE

## 2025-03-04 SDOH — ECONOMIC STABILITY: FOOD INSECURITY: WITHIN THE PAST 12 MONTHS, YOU WORRIED THAT YOUR FOOD WOULD RUN OUT BEFORE YOU GOT MONEY TO BUY MORE.: NEVER TRUE

## 2025-03-04 ASSESSMENT — ENCOUNTER SYMPTOMS
CONSTIPATION: 0
SINUS PAIN: 0
SHORTNESS OF BREATH: 0
EYE PAIN: 0
BLURRED VISION: 0
ABDOMINAL PAIN: 0
DIARRHEA: 0
SORE THROAT: 0
VOMITING: 0
NAUSEA: 0
COUGH: 0

## 2025-03-04 ASSESSMENT — PATIENT HEALTH QUESTIONNAIRE - PHQ9
SUM OF ALL RESPONSES TO PHQ QUESTIONS 1-9: 0
2. FEELING DOWN, DEPRESSED OR HOPELESS: NOT AT ALL
SUM OF ALL RESPONSES TO PHQ QUESTIONS 1-9: 0
1. LITTLE INTEREST OR PLEASURE IN DOING THINGS: NOT AT ALL

## 2025-03-04 NOTE — PROGRESS NOTES
Status:   Future     Standing Expiration Date:   3/4/2026    Lipid Panel     Standing Status:   Future     Standing Expiration Date:   3/4/2026    Comprehensive Metabolic Panel, Fasting     Standing Status:   Future     Standing Expiration Date:   3/4/2026    Sleep Study with PAP Titration     Standing Status:   Future     Standing Expiration Date:   3/4/2026     Order Specific Question:   Sleep Study Titration Type     Answer:   Split Night Study (Baseline followed by PAP Titration)     Order Specific Question:   Location For Sleep Study     Answer:   [place]     Order Specific Question:   Select Sleep Lab Location     Answer:   [facility]     Order Specific Question:   Pre-Study Patient Questions:     Answer:   Snores loudly during sleep     Order Specific Question:   Pre-Study Patient Questions:     Answer:   Complains of daytime sleepiness     Order Specific Question:   Pre-Study Patient Questions:     Answer:   Tosses and Turns all night or describes their sleep as restless     Order Specific Question:   Pre-Study Patient Questions:     Answer:   Reports multiple awakenings throughout the night     Order Specific Question:   Pre-Study Patient Questions:     Answer:   Reports choking or gasping for breath during sleep     Order Specific Question:   Pre-Study Patient Questions:     Answer:   Impaired attention, concentration, or memory due to poor sleep     Order Specific Question:   Pre-Study Patient Questions:     Answer:   Complains of morning headaches         Patient given educational materials - see patient instructions.  Discussed use, benefit, and side effects of prescribedmedications.  All patient questions answered. Pt voiced understanding. Reviewed health maintenance.  Instructed to continue current medications, diet and exercise.  Patient agreed with treatment plan. Follow up as directed.        Electronically signed by Bayron Wolfe PA-C on 3/4/2025 at 2:40 PM

## 2025-03-07 ENCOUNTER — TELEPHONE (OUTPATIENT)
Dept: PRIMARY CARE CLINIC | Age: 45
End: 2025-03-07

## 2025-03-07 NOTE — TELEPHONE ENCOUNTER
Writer spoke with provider, he is willing to complete for 3 days a month for medical visits, forms completed and waiting for PCP to review and sign.

## 2025-03-07 NOTE — TELEPHONE ENCOUNTER
Pt called into office to check on his FMLA. Writer checked with PCP's MA and he said he is still waiting on provider.    Pt was informed and verbalized understanding

## 2025-03-10 ENCOUNTER — HOSPITAL ENCOUNTER (OUTPATIENT)
Age: 45
Setting detail: SPECIMEN
Discharge: HOME | End: 2025-03-10

## 2025-03-10 ENCOUNTER — RESULTS FOLLOW-UP (OUTPATIENT)
Dept: PRIMARY CARE CLINIC | Age: 45
End: 2025-03-10

## 2025-03-10 DIAGNOSIS — E66.01 MORBID OBESITY WITH BMI OF 50.0-59.9, ADULT (HCC): ICD-10-CM

## 2025-03-10 DIAGNOSIS — E66.01 MORBID OBESITY WITH BMI OF 50.0-59.9, ADULT: ICD-10-CM

## 2025-03-10 DIAGNOSIS — I10 PRIMARY HYPERTENSION: ICD-10-CM

## 2025-03-10 DIAGNOSIS — Z12.5 ENCOUNTER FOR SCREENING PROSTATE SPECIFIC ANTIGEN (PSA) MEASUREMENT: ICD-10-CM

## 2025-03-10 DIAGNOSIS — R73.03 PREDIABETES: Primary | ICD-10-CM

## 2025-03-10 DIAGNOSIS — R73.03 PREDIABETES: ICD-10-CM

## 2025-03-10 DIAGNOSIS — E88.810 METABOLIC SYNDROME: ICD-10-CM

## 2025-03-10 LAB
ALBUMIN SERPL-MCNC: 3.9 G/DL (ref 3.5–5.2)
ALBUMIN/GLOB SERPL: 1.1 {RATIO} (ref 1–2.5)
ALP SERPL-CCNC: 65 U/L (ref 40–129)
ALT SERPL-CCNC: 17 U/L (ref 10–50)
ANION GAP SERPL CALCULATED.3IONS-SCNC: 12 MMOL/L (ref 9–16)
AST SERPL-CCNC: 18 U/L (ref 10–50)
BILIRUB SERPL-MCNC: 0.3 MG/DL (ref 0–1.2)
BUN SERPL-MCNC: 12 MG/DL (ref 6–20)
CALCIUM SERPL-MCNC: 8.8 MG/DL (ref 8.6–10.4)
CHLORIDE SERPL-SCNC: 106 MMOL/L (ref 98–107)
CHOLEST SERPL-MCNC: 155 MG/DL (ref 0–199)
CHOLESTEROL/HDL RATIO: 3.7
CO2 SERPL-SCNC: 26 MMOL/L (ref 20–31)
CREAT SERPL-MCNC: 1.1 MG/DL (ref 0.7–1.2)
EST. AVERAGE GLUCOSE BLD GHB EST-MCNC: 131 MG/DL
GFR, ESTIMATED: 84 ML/MIN/1.73M2
GLUCOSE P FAST SERPL-MCNC: 117 MG/DL (ref 74–99)
HBA1C MFR BLD: 6.2 % (ref 4–6)
HDLC SERPL-MCNC: 42 MG/DL
LDLC SERPL CALC-MCNC: 97 MG/DL (ref 0–100)
POTASSIUM SERPL-SCNC: 3.8 MMOL/L (ref 3.7–5.3)
PROT SERPL-MCNC: 7.3 G/DL (ref 6.6–8.7)
PSA SERPL-MCNC: 0.54 NG/ML (ref 0–4)
SODIUM SERPL-SCNC: 144 MMOL/L (ref 136–145)
TRIGL SERPL-MCNC: 79 MG/DL
VLDLC SERPL CALC-MCNC: 16 MG/DL (ref 1–30)

## 2025-03-11 NOTE — TELEPHONE ENCOUNTER
Call made to the patient to inform him that his paperwork is complete and ready for .  Writer spoke with the patient and the patient verbalized understanding.

## 2025-04-18 DIAGNOSIS — L73.8 FOLLICULITIS BARBAE: ICD-10-CM

## 2025-04-18 DIAGNOSIS — E55.9 VITAMIN D DEFICIENCY: ICD-10-CM

## 2025-04-21 RX ORDER — ERGOCALCIFEROL 1.25 MG/1
50000 CAPSULE, LIQUID FILLED ORAL WEEKLY
Qty: 4 CAPSULE | Refills: 2 | Status: SHIPPED | OUTPATIENT
Start: 2025-04-21

## 2025-04-21 RX ORDER — DOXYCYCLINE HYCLATE 100 MG
100 TABLET ORAL DAILY
Qty: 30 TABLET | Refills: 0 | Status: SHIPPED | OUTPATIENT
Start: 2025-04-21 | End: 2025-05-21

## 2025-06-12 ENCOUNTER — OFFICE VISIT (OUTPATIENT)
Dept: PRIMARY CARE CLINIC | Age: 45
End: 2025-06-12

## 2025-06-12 VITALS
BODY MASS INDEX: 42.66 KG/M2 | HEART RATE: 86 BPM | WEIGHT: 315 LBS | SYSTOLIC BLOOD PRESSURE: 126 MMHG | RESPIRATION RATE: 16 BRPM | HEIGHT: 72 IN | DIASTOLIC BLOOD PRESSURE: 84 MMHG | OXYGEN SATURATION: 98 %

## 2025-06-12 DIAGNOSIS — I10 PRIMARY HYPERTENSION: ICD-10-CM

## 2025-06-12 DIAGNOSIS — R42 DIZZINESS: Primary | ICD-10-CM

## 2025-06-12 DIAGNOSIS — R42 ORTHOSTATIC LIGHTHEADEDNESS: ICD-10-CM

## 2025-06-12 DIAGNOSIS — I42.8 NONISCHEMIC CARDIOMYOPATHY (HCC): ICD-10-CM

## 2025-06-12 PROCEDURE — 3079F DIAST BP 80-89 MM HG: CPT | Performed by: PHYSICIAN ASSISTANT

## 2025-06-12 PROCEDURE — 99214 OFFICE O/P EST MOD 30 MIN: CPT | Performed by: PHYSICIAN ASSISTANT

## 2025-06-12 PROCEDURE — 3074F SYST BP LT 130 MM HG: CPT | Performed by: PHYSICIAN ASSISTANT

## 2025-06-12 SDOH — ECONOMIC STABILITY: FOOD INSECURITY: WITHIN THE PAST 12 MONTHS, YOU WORRIED THAT YOUR FOOD WOULD RUN OUT BEFORE YOU GOT MONEY TO BUY MORE.: NEVER TRUE

## 2025-06-12 SDOH — ECONOMIC STABILITY: FOOD INSECURITY: WITHIN THE PAST 12 MONTHS, THE FOOD YOU BOUGHT JUST DIDN'T LAST AND YOU DIDN'T HAVE MONEY TO GET MORE.: NEVER TRUE

## 2025-06-12 ASSESSMENT — ENCOUNTER SYMPTOMS
RHINORRHEA: 0
VOMITING: 0
NAUSEA: 0
SHORTNESS OF BREATH: 0
SINUS PAIN: 0
EYE PAIN: 0
ABDOMINAL PAIN: 0
DIARRHEA: 0
CONSTIPATION: 0
BACK PAIN: 0
COUGH: 0

## 2025-06-12 ASSESSMENT — PATIENT HEALTH QUESTIONNAIRE - PHQ9
2. FEELING DOWN, DEPRESSED OR HOPELESS: NOT AT ALL
SUM OF ALL RESPONSES TO PHQ QUESTIONS 1-9: 0
1. LITTLE INTEREST OR PLEASURE IN DOING THINGS: NOT AT ALL

## 2025-06-12 NOTE — PROGRESS NOTES
B vaccine (1 of 3 - 19+ 3-dose series) Never done    Pneumococcal 0-49 years Vaccine (1 of 2 - PCV) Never done    COVID-19 Vaccine (5 - 2024-25 season) 09/01/2024    Flu vaccine (Season Ended) 08/01/2025    A1C test (Diabetic or Prediabetic)  03/10/2026    Depression Screen  06/12/2026    Colorectal Cancer Screen  05/07/2027    Lipids  03/10/2030    DTaP/Tdap/Td vaccine (2 - Td or Tdap) 05/01/2031    Hepatitis A vaccine  Aged Out    Hib vaccine  Aged Out    HPV vaccine  Aged Out    Polio vaccine  Aged Out    Meningococcal (ACWY) vaccine  Aged Out    Meningococcal B vaccine  Aged Out    Diabetes screen  Discontinued       Subjective:      Review of Systems   Constitutional:  Negative for chills, fatigue and fever.   HENT:  Negative for congestion, rhinorrhea and sinus pain.    Eyes:  Negative for pain.   Respiratory:  Negative for cough and shortness of breath.    Cardiovascular:  Negative for chest pain and leg swelling.   Gastrointestinal:  Negative for abdominal pain, constipation, diarrhea, nausea and vomiting.   Genitourinary:  Negative for difficulty urinating, enuresis and testicular pain.   Musculoskeletal:  Negative for arthralgias, back pain and myalgias.   Skin:  Negative for rash.   Neurological:  Positive for dizziness and light-headedness. Negative for seizures, facial asymmetry, speech difficulty and headaches.   Psychiatric/Behavioral:  Negative for confusion and sleep disturbance. The patient is not nervous/anxious.    All other systems reviewed and are negative.      Objective:     Physical Exam  Vitals and nursing note reviewed.   Constitutional:       General: He is not in acute distress.     Appearance: Normal appearance. He is obese.   HENT:      Head: Normocephalic.      Right Ear: External ear normal.      Left Ear: External ear normal.      Mouth/Throat:      Mouth: Mucous membranes are moist.   Eyes:      Extraocular Movements: Extraocular movements intact.      Conjunctiva/sclera:

## (undated) DEVICE — Device: Brand: DEFENDO VALVE AND CONNECTOR KIT

## (undated) DEVICE — SNARE ENDOSCP L240CM LOOP W13MM SHTH DIA2.4MM SM OVL FLX

## (undated) DEVICE — PERRYSBURG ENDO PACK: Brand: MEDLINE INDUSTRIES, INC.

## (undated) DEVICE — ADAPTER CLEANING PORPOISE CLEANING

## (undated) DEVICE — GLOVE ORANGE PI 7 1/2   MSG9075